# Patient Record
Sex: FEMALE | Race: WHITE | Employment: FULL TIME | URBAN - METROPOLITAN AREA
[De-identification: names, ages, dates, MRNs, and addresses within clinical notes are randomized per-mention and may not be internally consistent; named-entity substitution may affect disease eponyms.]

---

## 2017-03-19 ENCOUNTER — HOSPITAL ENCOUNTER (OUTPATIENT)
Dept: OTHER | Age: 62
Discharge: HOME | End: 2017-03-19
Attending: INTERNAL MEDICINE | Admitting: INTERNAL MEDICINE

## 2017-03-19 LAB
A/G RATIO: 1.1 (ref 1.1–2.2)
ALBUMIN SERPL-MCNC: 4.3 G/DL (ref 3.4–5)
ALP BLD-CCNC: 66 U/L (ref 40–129)
ALT SERPL-CCNC: 19 U/L (ref 10–40)
ANION GAP SERPL CALCULATED.3IONS-SCNC: 16 MMOL/L (ref 3–16)
AST SERPL-CCNC: 21 U/L (ref 15–37)
BASOPHILS ABSOLUTE: 0 K/UL (ref 0–0.2)
BASOPHILS RELATIVE PERCENT: 0.5 %
BILIRUB SERPL-MCNC: 0.3 MG/DL (ref 0–1)
BUN BLDV-MCNC: 17 MG/DL (ref 7–20)
CALCIUM SERPL-MCNC: 10 MG/DL (ref 8.3–10.6)
CHLORIDE BLD-SCNC: 96 MMOL/L (ref 99–110)
CHOLESTEROL, TOTAL: 166 MG/DL (ref 0–199)
CO2: 23 MMOL/L (ref 21–32)
CREAT SERPL-MCNC: 0.9 MG/DL (ref 0.6–1.2)
CREATININE URINE: 70.3 MG/DL (ref 28–259)
EOSINOPHILS ABSOLUTE: 0.1 K/UL (ref 0–0.6)
EOSINOPHILS RELATIVE PERCENT: 1.6 %
GFR AFRICAN AMERICAN: >60
GFR NON-AFRICAN AMERICAN: >60
GLOBULIN: 4 G/DL
GLUCOSE BLD-MCNC: 136 MG/DL (ref 70–99)
HCT VFR BLD CALC: 42 % (ref 36–48)
HDLC SERPL-MCNC: 39 MG/DL (ref 40–60)
HEMOGLOBIN: 13.7 G/DL (ref 12–16)
LDL CHOLESTEROL CALCULATED: 95 MG/DL
LYMPHOCYTES ABSOLUTE: 2.5 K/UL (ref 1–5.1)
LYMPHOCYTES RELATIVE PERCENT: 30.4 %
MCH RBC QN AUTO: 29.9 PG (ref 26–34)
MCHC RBC AUTO-ENTMCNC: 32.5 G/DL (ref 31–36)
MCV RBC AUTO: 91.7 FL (ref 80–100)
MICROALBUMIN UR-MCNC: <1.2 MG/DL
MICROALBUMIN/CREAT UR-RTO: NORMAL MG/G (ref 0–30)
MONOCYTES ABSOLUTE: 0.6 K/UL (ref 0–1.3)
MONOCYTES RELATIVE PERCENT: 6.8 %
NEUTROPHILS ABSOLUTE: 5 K/UL (ref 1.7–7.7)
NEUTROPHILS RELATIVE PERCENT: 60.7 %
PDW BLD-RTO: 14 % (ref 12.4–15.4)
PLATELET # BLD: 340 K/UL (ref 135–450)
PMV BLD AUTO: 8.6 FL (ref 5–10.5)
POTASSIUM SERPL-SCNC: 4.8 MMOL/L (ref 3.5–5.1)
RBC # BLD: 4.57 M/UL (ref 4–5.2)
SODIUM BLD-SCNC: 135 MMOL/L (ref 136–145)
TOTAL PROTEIN: 8.3 G/DL (ref 6.4–8.2)
TRIGL SERPL-MCNC: 159 MG/DL (ref 0–150)
TSH SERPL DL<=0.05 MIU/L-ACNC: 0.94 UIU/ML (ref 0.27–4.2)
VITAMIN B-12: 280 PG/ML (ref 211–911)
VITAMIN D 25-HYDROXY: 14.6 NG/ML
VLDLC SERPL CALC-MCNC: 32 MG/DL
WBC # BLD: 8.2 K/UL (ref 4–11)

## 2017-03-20 LAB
ESTIMATED AVERAGE GLUCOSE: 162.8 MG/DL
HBA1C MFR BLD: 7.3 %

## 2017-03-21 ENCOUNTER — TELEPHONE (OUTPATIENT)
Dept: PULMONOLOGY | Age: 62
End: 2017-03-21

## 2017-03-22 ENCOUNTER — OFFICE VISIT (OUTPATIENT)
Dept: PULMONOLOGY | Age: 62
End: 2017-03-22

## 2017-03-22 VITALS
HEIGHT: 65 IN | OXYGEN SATURATION: 98 % | SYSTOLIC BLOOD PRESSURE: 100 MMHG | BODY MASS INDEX: 42.15 KG/M2 | TEMPERATURE: 98.2 F | HEART RATE: 97 BPM | RESPIRATION RATE: 16 BRPM | DIASTOLIC BLOOD PRESSURE: 60 MMHG | WEIGHT: 253 LBS

## 2017-03-22 DIAGNOSIS — J44.9 CHRONIC OBSTRUCTIVE PULMONARY DISEASE, UNSPECIFIED COPD TYPE (HCC): Primary | ICD-10-CM

## 2017-03-22 DIAGNOSIS — G47.33 OSA (OBSTRUCTIVE SLEEP APNEA): ICD-10-CM

## 2017-03-22 DIAGNOSIS — E66.01 MORBID OBESITY WITH BMI OF 40.0-44.9, ADULT (HCC): ICD-10-CM

## 2017-03-22 PROCEDURE — 99214 OFFICE O/P EST MOD 30 MIN: CPT | Performed by: INTERNAL MEDICINE

## 2017-03-22 ASSESSMENT — SLEEP AND FATIGUE QUESTIONNAIRES
HOW LIKELY ARE YOU TO NOD OFF OR FALL ASLEEP WHILE SITTING AND TALKING TO SOMEONE: 0
HOW LIKELY ARE YOU TO NOD OFF OR FALL ASLEEP WHEN YOU ARE A PASSENGER IN A CAR FOR AN HOUR WITHOUT A BREAK: 1
HOW LIKELY ARE YOU TO NOD OFF OR FALL ASLEEP WHILE SITTING AND READING: 1
ESS TOTAL SCORE: 7
HOW LIKELY ARE YOU TO NOD OFF OR FALL ASLEEP WHILE SITTING INACTIVE IN A PUBLIC PLACE: 0
HOW LIKELY ARE YOU TO NOD OFF OR FALL ASLEEP WHILE SITTING QUIETLY AFTER LUNCH WITHOUT ALCOHOL: 1
NECK CIRCUMFERENCE (INCHES): 16
HOW LIKELY ARE YOU TO NOD OFF OR FALL ASLEEP WHILE WATCHING TV: 1
HOW LIKELY ARE YOU TO NOD OFF OR FALL ASLEEP WHILE LYING DOWN TO REST IN THE AFTERNOON WHEN CIRCUMSTANCES PERMIT: 3
HOW LIKELY ARE YOU TO NOD OFF OR FALL ASLEEP IN A CAR, WHILE STOPPED FOR A FEW MINUTES IN TRAFFIC: 0

## 2018-03-26 ENCOUNTER — OFFICE VISIT (OUTPATIENT)
Dept: PULMONOLOGY | Age: 63
End: 2018-03-26

## 2018-03-26 ENCOUNTER — TELEPHONE (OUTPATIENT)
Dept: PULMONOLOGY | Age: 63
End: 2018-03-26

## 2018-03-26 VITALS
TEMPERATURE: 97.3 F | BODY MASS INDEX: 43.15 KG/M2 | SYSTOLIC BLOOD PRESSURE: 132 MMHG | HEIGHT: 65 IN | HEART RATE: 98 BPM | RESPIRATION RATE: 16 BRPM | WEIGHT: 259 LBS | DIASTOLIC BLOOD PRESSURE: 83 MMHG | OXYGEN SATURATION: 96 %

## 2018-03-26 DIAGNOSIS — Z71.89 ENCOUNTER FOR BIPAP USE COUNSELING: ICD-10-CM

## 2018-03-26 DIAGNOSIS — E66.01 OBESITY, CLASS III, BMI 40-49.9 (MORBID OBESITY) (HCC): ICD-10-CM

## 2018-03-26 DIAGNOSIS — G47.33 OSA (OBSTRUCTIVE SLEEP APNEA): Primary | ICD-10-CM

## 2018-03-26 PROBLEM — E66.813 OBESITY, CLASS III, BMI 40-49.9 (MORBID OBESITY) (HCC): Status: ACTIVE | Noted: 2018-03-26

## 2018-03-26 PROCEDURE — 99213 OFFICE O/P EST LOW 20 MIN: CPT | Performed by: NURSE PRACTITIONER

## 2018-03-26 ASSESSMENT — SLEEP AND FATIGUE QUESTIONNAIRES
HOW LIKELY ARE YOU TO NOD OFF OR FALL ASLEEP WHEN YOU ARE A PASSENGER IN A CAR FOR AN HOUR WITHOUT A BREAK: 1
HOW LIKELY ARE YOU TO NOD OFF OR FALL ASLEEP WHILE SITTING QUIETLY AFTER LUNCH WITHOUT ALCOHOL: 0
ESS TOTAL SCORE: 8
HOW LIKELY ARE YOU TO NOD OFF OR FALL ASLEEP WHILE SITTING AND READING: 1
HOW LIKELY ARE YOU TO NOD OFF OR FALL ASLEEP WHILE SITTING INACTIVE IN A PUBLIC PLACE: 1
HOW LIKELY ARE YOU TO NOD OFF OR FALL ASLEEP WHILE LYING DOWN TO REST IN THE AFTERNOON WHEN CIRCUMSTANCES PERMIT: 3
NECK CIRCUMFERENCE (INCHES): 16.25
HOW LIKELY ARE YOU TO NOD OFF OR FALL ASLEEP WHILE SITTING AND TALKING TO SOMEONE: 0
HOW LIKELY ARE YOU TO NOD OFF OR FALL ASLEEP WHILE WATCHING TV: 2
HOW LIKELY ARE YOU TO NOD OFF OR FALL ASLEEP IN A CAR, WHILE STOPPED FOR A FEW MINUTES IN TRAFFIC: 0

## 2018-03-26 NOTE — PROGRESS NOTES
Patient ID: Ruy Cheatham is a 65 y.o. female who is being seen today for   Chief Complaint   Patient presents with    Sleep Apnea     1 year fu         HPI:     Ruy Cheatham is a 65 y.o. female in office for JAYY follow up. Patient is using BiPAP 7 hrs/night. Using humidifier. No snoring on BiPAP. The pressure is too light. The mask is comfortable-full face mask. No mask leak. No significant daytime sleepiness. No nodding off when driving. + dry mouth and nose at times- has not adjusted humidification- set on 2. +fatigue. Bedtime is 11 pm and rise time is 6 am. Sleep onset is few minutes. Wakes up 0-1 times at night total. 0-1 nocturia. It takes few minutes to fall back a sleep. 1-2 naps during the day for 20 minutes. No headache in am. No weight gain. 3 caffienated beverages during the day. No alcohol. ESS is 8      Sleep Medicine 3/26/2018 3/22/2017 3/8/2016 7/28/2015 5/19/2015 4/22/2015   Sitting and reading 1 1 0 1 3 0   Watching TV 2 1 1 2 3 2   Sitting, inactive in a public place (e.g. a theatre or a meeting) 1 0 1 1 2 1   As a passenger in a car for an hour without a break 1 1 1 2 2 2   Lying down to rest in the afternoon when circumstances permit 3 3 0 3 2 2   Sitting and talking to someone 0 0 0 1 0 0   Sitting quietly after a lunch without alcohol 0 1 0 1 2 2   In a car, while stopped for a few minutes in traffic 0 0 0 1 0 0   Total score 8 7 3 12 14 9   Neck circumference 16.25 16 16 16 16 15       Past Medical History:  Past Medical History:   Diagnosis Date    [facility] [address]     Diabetes mellitus [facility] [address]     Hypertension        Past Surgical History:        Procedure Laterality Date    CHOLECYSTECTOMY      COLPOSCOPY         Allergies:  is allergic to penicillins. Social History:    TOBACCO:   reports that she quit smoking about 3 years ago. Her smoking use included Cigarettes. She has a 30.00 pack-year smoking history.  She has never used smokeless compliance data:  Compliance download report from 2/24/18 to 3/25/18 reviewed today by me and showed patient is using machine 5:34 hrs/night with 80% compliance and AHI 1.0 within this time frame. 24/30days with greater than 4 hours of machine use. 90% pressure 18.7/14.7 cm H20    Assessment:      · Severe JAYY. Auto BiPAP EPAP minimum 13, IPAP maximum 19, pressure support 4 cm H2O. Optimal compliance and efficacy on review today. · Obesity  · COPDfollowed by Dr. Jennifer Hunter  · DM and HTN followed by PCP    Plan:     - Changed in office to BiPAP 19/15 cm H2O  -Follow AHI and adjust pressure if needed  -Mask fitting in office with RT  -Demonstrated in office how to adjust humidification level. Changed from 2 to 3 today-can adjust further if needed  - Advised to use BIPAP 6-8 hrs at night and during naps. - Replacement of mask, tubing, head straps every 3-6 months or sooner if damaged. - Patient instructed to contact [employer] for any mask, tubing or machine trouble shooting if problems arise.  - Sleep hygiene  - Avoid sedatives, alcohol and caffeinated drinks at bed time. - Patient counseled to never drive or operate heavy machinery while fatigue, drowsy or sleepy. - Weight loss is recommended as a long-term intervention.  - Complications of JAYY if not treated were discussed with patient patient, including: systemic hypertension, pulmonary hypertension, cardiovascular morbidities, car accidents and all cause mortality.  -Patient education handout provided regarding sleep tips and PAP cleaning recommendations     Follow up:  One year, sooner if needed

## 2018-03-26 NOTE — PATIENT INSTRUCTIONS
Please keep all of your future appointments scheduled by [address] office. Sleep Hygiene. .. Tips for better sleep. .. Avoid naps. This will ensure you are sleepy at bedtime. If you have to take a nap, sleep less than 1 hour, before 3 pm.  Sleep only when sleepy; this reduces the time you are awake in bed. Regular exercise is recommended to help you deepen your sleep, but not within 4-6 hours of your bedtime. Timing of exercise is important, aim to exercise early in the morning or early afternoon. A light snack may help you fall asleep. Warm milk and foods high in the amino acid tryptophan, such as bananas, may help you to sleep  Be sure to avoid heavy, spicy or sugary foods 4-6 hours before bedtime and avoid at snack time. Stay away from stimulants such as caffeine and nicotine for at least 4-6 hours before bed. Stimulants can interfere with your ability to fall asleep. Caffeine is found in tea, cola, coffee, cocoa and chocolate and is best avoided at bedtime. Nicotine is found in tobacco products. Avoid alcohol 4-6 hours before bedtime. Alcohol has an immediate sleep-inducing effect, after a few hours when alcohol levels fall there is a stimulant or wake-up effect and will cause fragmented sleep. Sleep rituals are important. Give your body clues it is time to slow down and sleep. Examples include; yoga, deep breathing, listen to relaxing music, a hot bath or a few minutes of reading. Have a fixed bedtime and awakening time, Even on weekends! You will feel better keeping a regular sleep cycle, even if you are retired or not working. Get into your favorite sleep position. If not asleep in 30 minutes, get up and do something boring until you feel sleepy. Remember not to expose yourself to bright lights such as TV, phone or tablet screens. Only use your bed for sleeping. Do not use your bed as an office, workroom or recreation room. Use comfortable bedding. Uncomfortable bedding can prevent good sleep. Ensure your bedroom is quiet and comfortable. A cooler room along with enough blankets to stay warm is recommended. If your room is too noisy, try a white noise machine. If too bright, try black out shades or an eye mask. Dont take worries to bed. Leave worries about work, school etc. behind you when you go to bed. Some people find it helpful to assign a worry period in the evening or late afternoon to write down your worries and get them out of your system. CPAP Equipment Cleaning and Disinfecting Schedule  Equipment Cleaning Frequency Instructions  Disinfecting Frequency   Non-Disposable Filters  Weekly Mild soapy water, Rinse, Air Dry Not Required   Disposable Filters Change as needed  2-4 weeks Do Not Wash Not Required   Hose/tubing Daily Mild soapy water, Rinse, Air Dry Once a week   Mask / Nasal Pillows Daily Mild soapy water, Rinse, Air Dry Once a week   Headgear Weekly Hand wash, Mild soapy water, Rinse, Dry  Not Required   Humidifier Daily Empty water daily  Mild soapy water, Rinse well, Air Dry  Once a week   CPAP Unit As Needed Dust with damp cloth,  No detergents or sprays Not Required         Disinfect (per schedule) with 1 part white vinegar and 3 parts water- soak mask and water chamber for 30 minutes every 1-2 weeks, more often if sick. Allow water/vinegar mixture to run through tubing. Allow all equipment to air dry. Drying Hints:   Always hang tubing away from direct sunlight, as this will cause the tubing to become yellow, brittle and crack over a period of time. DO NOT attach the wet tubing to your CPAP unit to blow-dry it. The moisture from the tubing can drain back into your machine. Moisture in your unit can cause sudden pressure increases or short circuits  DO's and DON'Ts:  - Don't use alcohol-based products to clean your mask, because it can cause the materials to become hard and brittle.    - Don't put headgear in the washer or dryer  - Don't use any caustic or household cleaning solutions such as bleach on your CPAP   equipment.  - Do follow the recommended cleaning schedule. - Do change your disposable filter frequently. Adapted From: [employer]/cleaning. shtm.   These are general suggestions for all models please follow specific s recommendations and specific instructions

## 2018-04-05 ENCOUNTER — OFFICE VISIT (OUTPATIENT)
Dept: ORTHOPEDIC SURGERY | Age: 63
End: 2018-04-05

## 2018-04-05 VITALS
HEIGHT: 66 IN | SYSTOLIC BLOOD PRESSURE: 119 MMHG | WEIGHT: 250 LBS | DIASTOLIC BLOOD PRESSURE: 72 MMHG | BODY MASS INDEX: 40.18 KG/M2 | HEART RATE: 80 BPM

## 2018-04-05 DIAGNOSIS — M23.203 DEGENERATIVE TEAR OF MEDIAL MENISCUS OF RIGHT KNEE: ICD-10-CM

## 2018-04-05 DIAGNOSIS — M25.561 ACUTE PAIN OF RIGHT KNEE: Primary | ICD-10-CM

## 2018-04-05 PROCEDURE — 20610 DRAIN/INJ JOINT/BURSA W/O US: CPT | Performed by: ORTHOPAEDIC SURGERY

## 2018-04-05 PROCEDURE — 99203 OFFICE O/P NEW LOW 30 MIN: CPT | Performed by: ORTHOPAEDIC SURGERY

## 2018-04-05 RX ORDER — MELOXICAM 15 MG/1
15 TABLET ORAL DAILY
Qty: 30 TABLET | Refills: 3 | Status: SHIPPED | OUTPATIENT
Start: 2018-04-05 | End: 2018-08-03 | Stop reason: SDUPTHER

## 2018-04-19 ENCOUNTER — OFFICE VISIT (OUTPATIENT)
Dept: ORTHOPEDIC SURGERY | Age: 63
End: 2018-04-19

## 2018-04-19 VITALS — WEIGHT: 250 LBS | BODY MASS INDEX: 44.3 KG/M2 | HEIGHT: 63 IN

## 2018-04-19 DIAGNOSIS — M23.203 DEGENERATIVE TEAR OF MEDIAL MENISCUS OF RIGHT KNEE: Primary | ICD-10-CM

## 2018-04-19 PROCEDURE — 99213 OFFICE O/P EST LOW 20 MIN: CPT | Performed by: ORTHOPAEDIC SURGERY

## 2018-04-20 ENCOUNTER — TELEPHONE (OUTPATIENT)
Dept: ORTHOPEDIC SURGERY | Age: 63
End: 2018-04-20

## 2018-05-01 ENCOUNTER — OFFICE VISIT (OUTPATIENT)
Dept: ORTHOPEDIC SURGERY | Age: 63
End: 2018-05-01

## 2018-05-01 VITALS
HEIGHT: 66 IN | BODY MASS INDEX: 41.78 KG/M2 | HEART RATE: 82 BPM | DIASTOLIC BLOOD PRESSURE: 60 MMHG | SYSTOLIC BLOOD PRESSURE: 114 MMHG | WEIGHT: 260 LBS

## 2018-05-01 DIAGNOSIS — M84.453A INSUFFICIENCY FRACTURE OF MEDIAL FEMORAL CONDYLE (HCC): ICD-10-CM

## 2018-05-01 DIAGNOSIS — M84.469A INSUFFICIENCY FRACTURE OF TIBIA, INITIAL ENCOUNTER: Primary | ICD-10-CM

## 2018-05-01 DIAGNOSIS — M23.203 DEGENERATIVE TEAR OF MEDIAL MENISCUS OF RIGHT KNEE: ICD-10-CM

## 2018-05-01 DIAGNOSIS — M94.261 CHONDROMALACIA OF KNEE, RIGHT: ICD-10-CM

## 2018-05-01 PROCEDURE — 99214 OFFICE O/P EST MOD 30 MIN: CPT | Performed by: ORTHOPAEDIC SURGERY

## 2018-05-08 ENCOUNTER — PAT TELEPHONE (OUTPATIENT)
Dept: PREADMISSION TESTING | Age: 63
End: 2018-05-08

## 2018-05-08 ENCOUNTER — TELEPHONE (OUTPATIENT)
Dept: ORTHOPEDIC SURGERY | Age: 63
End: 2018-05-08

## 2018-05-08 VITALS — BODY MASS INDEX: 41.78 KG/M2 | HEIGHT: 66 IN | WEIGHT: 260 LBS

## 2018-05-18 ENCOUNTER — HOSPITAL ENCOUNTER (OUTPATIENT)
Dept: SURGERY | Age: 63
Discharge: HOME | End: 2018-05-18
Attending: ORTHOPAEDIC SURGERY | Admitting: ORTHOPAEDIC SURGERY

## 2018-05-18 VITALS
SYSTOLIC BLOOD PRESSURE: 143 MMHG | OXYGEN SATURATION: 97 % | DIASTOLIC BLOOD PRESSURE: 62 MMHG | BODY MASS INDEX: 41.78 KG/M2 | RESPIRATION RATE: 16 BRPM | HEIGHT: 66 IN | WEIGHT: 260 LBS | TEMPERATURE: 98.4 F | HEART RATE: 87 BPM

## 2018-05-18 DIAGNOSIS — M84.453A INSUFFICIENCY FRACTURE OF MEDIAL FEMORAL CONDYLE (HCC): Primary | ICD-10-CM

## 2018-05-18 LAB
GLUCOSE BLD-MCNC: 110 MG/DL (ref 70–99)
GLUCOSE BLD-MCNC: 135 MG/DL (ref 70–99)
PERFORMED ON: ABNORMAL
PERFORMED ON: ABNORMAL

## 2018-05-18 RX ORDER — LABETALOL HYDROCHLORIDE 5 MG/ML
5 INJECTION, SOLUTION INTRAVENOUS EVERY 10 MIN PRN
Status: DISCONTINUED | OUTPATIENT
Start: 2018-05-18 | End: 2018-05-19 | Stop reason: HOSPADM

## 2018-05-18 RX ORDER — LIDOCAINE HYDROCHLORIDE 10 MG/ML
1 INJECTION, SOLUTION EPIDURAL; INFILTRATION; INTRACAUDAL; PERINEURAL
Status: COMPLETED | OUTPATIENT
Start: 2018-05-18 | End: 2018-05-18

## 2018-05-18 RX ORDER — OXYCODONE HYDROCHLORIDE AND ACETAMINOPHEN 5; 325 MG/1; MG/1
2 TABLET ORAL PRN
Status: COMPLETED | OUTPATIENT
Start: 2018-05-18 | End: 2018-05-18

## 2018-05-18 RX ORDER — HYDROMORPHONE HCL 110MG/55ML
0.5 PATIENT CONTROLLED ANALGESIA SYRINGE INTRAVENOUS EVERY 5 MIN PRN
Status: DISCONTINUED | OUTPATIENT
Start: 2018-05-18 | End: 2018-05-19 | Stop reason: HOSPADM

## 2018-05-18 RX ORDER — BUPIVACAINE HYDROCHLORIDE 5 MG/ML
INJECTION, SOLUTION EPIDURAL; INTRACAUDAL
Status: DISPENSED
Start: 2018-05-18 | End: 2018-05-18

## 2018-05-18 RX ORDER — SODIUM CHLORIDE 0.9 % (FLUSH) 0.9 %
10 SYRINGE (ML) INJECTION PRN
Status: DISCONTINUED | OUTPATIENT
Start: 2018-05-18 | End: 2018-05-19 | Stop reason: HOSPADM

## 2018-05-18 RX ORDER — SODIUM CHLORIDE 0.9 % (FLUSH) 0.9 %
10 SYRINGE (ML) INJECTION EVERY 12 HOURS SCHEDULED
Status: DISCONTINUED | OUTPATIENT
Start: 2018-05-18 | End: 2018-05-19 | Stop reason: HOSPADM

## 2018-05-18 RX ORDER — HYDROMORPHONE HCL 110MG/55ML
0.25 PATIENT CONTROLLED ANALGESIA SYRINGE INTRAVENOUS EVERY 5 MIN PRN
Status: DISCONTINUED | OUTPATIENT
Start: 2018-05-18 | End: 2018-05-19 | Stop reason: HOSPADM

## 2018-05-18 RX ORDER — MEPERIDINE HYDROCHLORIDE 25 MG/ML
12.5 INJECTION INTRAMUSCULAR; INTRAVENOUS; SUBCUTANEOUS EVERY 5 MIN PRN
Status: DISCONTINUED | OUTPATIENT
Start: 2018-05-18 | End: 2018-05-19 | Stop reason: HOSPADM

## 2018-05-18 RX ORDER — HYDRALAZINE HYDROCHLORIDE 20 MG/ML
5 INJECTION INTRAMUSCULAR; INTRAVENOUS EVERY 10 MIN PRN
Status: DISCONTINUED | OUTPATIENT
Start: 2018-05-18 | End: 2018-05-19 | Stop reason: HOSPADM

## 2018-05-18 RX ORDER — DEXAMETHASONE SODIUM PHOSPHATE 10 MG/ML
INJECTION, SOLUTION INTRAMUSCULAR; INTRAVENOUS
Status: DISPENSED
Start: 2018-05-18 | End: 2018-05-18

## 2018-05-18 RX ORDER — ONDANSETRON 2 MG/ML
4 INJECTION INTRAMUSCULAR; INTRAVENOUS EVERY 10 MIN PRN
Status: DISCONTINUED | OUTPATIENT
Start: 2018-05-18 | End: 2018-05-19 | Stop reason: HOSPADM

## 2018-05-18 RX ORDER — OXYCODONE HYDROCHLORIDE AND ACETAMINOPHEN 5; 325 MG/1; MG/1
1 TABLET ORAL EVERY 6 HOURS PRN
Qty: 28 TABLET | Refills: 0 | Status: SHIPPED | OUTPATIENT
Start: 2018-05-18 | End: 2018-05-25

## 2018-05-18 RX ORDER — OXYCODONE HYDROCHLORIDE AND ACETAMINOPHEN 5; 325 MG/1; MG/1
TABLET ORAL
Status: DISPENSED
Start: 2018-05-18 | End: 2018-05-18

## 2018-05-18 RX ORDER — ACETAMINOPHEN 10 MG/ML
1000 INJECTION, SOLUTION INTRAVENOUS ONCE
Status: COMPLETED | OUTPATIENT
Start: 2018-05-18 | End: 2018-05-18

## 2018-05-18 RX ORDER — OXYCODONE HYDROCHLORIDE AND ACETAMINOPHEN 5; 325 MG/1; MG/1
1 TABLET ORAL PRN
Status: COMPLETED | OUTPATIENT
Start: 2018-05-18 | End: 2018-05-18

## 2018-05-18 RX ORDER — SODIUM CHLORIDE, SODIUM LACTATE, POTASSIUM CHLORIDE, CALCIUM CHLORIDE 600; 310; 30; 20 MG/100ML; MG/100ML; MG/100ML; MG/100ML
INJECTION, SOLUTION INTRAVENOUS CONTINUOUS
Status: DISCONTINUED | OUTPATIENT
Start: 2018-05-18 | End: 2018-05-19 | Stop reason: HOSPADM

## 2018-05-18 RX ORDER — MIDAZOLAM HYDROCHLORIDE 1 MG/ML
INJECTION INTRAMUSCULAR; INTRAVENOUS
Status: DISPENSED
Start: 2018-05-18 | End: 2018-05-18

## 2018-05-18 RX ADMIN — LIDOCAINE HYDROCHLORIDE 0.1 ML: 10 INJECTION, SOLUTION EPIDURAL; INFILTRATION; INTRACAUDAL; PERINEURAL at 07:05

## 2018-05-18 RX ADMIN — SODIUM CHLORIDE, SODIUM LACTATE, POTASSIUM CHLORIDE, CALCIUM CHLORIDE: 600; 310; 30; 20 INJECTION, SOLUTION INTRAVENOUS at 07:06

## 2018-05-18 RX ADMIN — OXYCODONE HYDROCHLORIDE AND ACETAMINOPHEN 2 TABLET: 5; 325 TABLET ORAL at 10:25

## 2018-05-18 RX ADMIN — ACETAMINOPHEN 1000 MG: 10 INJECTION, SOLUTION INTRAVENOUS at 08:03

## 2018-05-18 ASSESSMENT — PAIN SCALES - GENERAL
PAINLEVEL_OUTOF10: 7
PAINLEVEL_OUTOF10: 0
PAINLEVEL_OUTOF10: 0

## 2018-05-18 ASSESSMENT — PAIN DESCRIPTION - PAIN TYPE: TYPE: SURGICAL PAIN

## 2018-05-18 ASSESSMENT — PAIN DESCRIPTION - DESCRIPTORS
DESCRIPTORS: ACHING
DESCRIPTORS: BURNING;ACHING

## 2018-05-18 ASSESSMENT — COPD QUESTIONNAIRES: CAT_SEVERITY: MILD

## 2018-05-18 ASSESSMENT — PAIN DESCRIPTION - ORIENTATION: ORIENTATION: RIGHT

## 2018-05-18 ASSESSMENT — PAIN DESCRIPTION - LOCATION: LOCATION: KNEE

## 2018-05-18 ASSESSMENT — ACTIVITIES OF DAILY LIVING (ADL): EFFECT OF PAIN ON DAILY ACTIVITIES: WALKING INCREASES PAIN

## 2018-05-18 ASSESSMENT — PAIN - FUNCTIONAL ASSESSMENT: PAIN_FUNCTIONAL_ASSESSMENT: 0-10

## 2018-05-21 DIAGNOSIS — M84.453A INSUFFICIENCY FRACTURE OF MEDIAL FEMORAL CONDYLE (HCC): Primary | ICD-10-CM

## 2018-05-24 ENCOUNTER — OFFICE VISIT (OUTPATIENT)
Dept: ORTHOPEDIC SURGERY | Age: 63
End: 2018-05-24

## 2018-05-24 ENCOUNTER — HOSPITAL ENCOUNTER (OUTPATIENT)
Dept: PHYSICAL THERAPY | Age: 63
Discharge: HOME | End: 2018-05-31
Admitting: ORTHOPAEDIC SURGERY

## 2018-05-24 DIAGNOSIS — Z98.890 S/P ARTHROSCOPIC PARTIAL MEDIAL MENISCECTOMY: Primary | ICD-10-CM

## 2018-05-24 PROCEDURE — 99024 POSTOP FOLLOW-UP VISIT: CPT | Performed by: ORTHOPAEDIC SURGERY

## 2018-05-30 ENCOUNTER — HOSPITAL ENCOUNTER (OUTPATIENT)
Dept: PHYSICAL THERAPY | Age: 63
Discharge: HOME | End: 2018-06-30
Admitting: ORTHOPAEDIC SURGERY

## 2018-06-01 ENCOUNTER — HOSPITAL ENCOUNTER (OUTPATIENT)
Dept: PHYSICAL THERAPY | Age: 63
Discharge: HOME | End: 2018-06-01
Attending: ORTHOPAEDIC SURGERY | Admitting: ORTHOPAEDIC SURGERY

## 2018-06-07 ENCOUNTER — HOSPITAL ENCOUNTER (OUTPATIENT)
Dept: PHYSICAL THERAPY | Age: 63
Discharge: HOME | End: 2018-06-08
Admitting: ORTHOPAEDIC SURGERY

## 2018-06-11 ENCOUNTER — HOSPITAL ENCOUNTER (OUTPATIENT)
Dept: PHYSICAL THERAPY | Age: 63
Discharge: HOME | End: 2018-06-12
Admitting: ORTHOPAEDIC SURGERY

## 2018-06-11 DIAGNOSIS — M23.203 DEGENERATIVE TEAR OF MEDIAL MENISCUS OF RIGHT KNEE: Primary | ICD-10-CM

## 2018-06-11 RX ORDER — HYDROCODONE BITARTRATE AND ACETAMINOPHEN 5; 325 MG/1; MG/1
1 TABLET ORAL EVERY 6 HOURS PRN
Qty: 28 TABLET | Refills: 0 | Status: SHIPPED | OUTPATIENT
Start: 2018-06-11 | End: 2018-06-18

## 2018-06-21 ENCOUNTER — OFFICE VISIT (OUTPATIENT)
Dept: ORTHOPEDIC SURGERY | Age: 63
End: 2018-06-21

## 2018-06-21 DIAGNOSIS — M17.11 PRIMARY OSTEOARTHRITIS OF RIGHT KNEE: ICD-10-CM

## 2018-06-21 DIAGNOSIS — Z98.890 S/P ARTHROSCOPIC PARTIAL MEDIAL MENISCECTOMY: Primary | ICD-10-CM

## 2018-06-21 PROCEDURE — 99024 POSTOP FOLLOW-UP VISIT: CPT | Performed by: ORTHOPAEDIC SURGERY

## 2018-06-28 ENCOUNTER — TELEPHONE (OUTPATIENT)
Dept: ORTHOPEDIC SURGERY | Age: 63
End: 2018-06-28

## 2018-07-01 ENCOUNTER — HOSPITAL ENCOUNTER (OUTPATIENT)
Dept: PHYSICAL THERAPY | Age: 63
Discharge: HOME | End: 2018-07-01
Attending: ORTHOPAEDIC SURGERY | Admitting: ORTHOPAEDIC SURGERY

## 2018-07-16 ENCOUNTER — OFFICE VISIT (OUTPATIENT)
Dept: ORTHOPEDIC SURGERY | Age: 63
End: 2018-07-16

## 2018-07-16 VITALS — WEIGHT: 248 LBS | BODY MASS INDEX: 39.86 KG/M2 | HEIGHT: 66 IN

## 2018-07-16 DIAGNOSIS — M17.11 PRIMARY OSTEOARTHRITIS OF RIGHT KNEE: Primary | ICD-10-CM

## 2018-07-16 PROCEDURE — 20610 DRAIN/INJ JOINT/BURSA W/O US: CPT | Performed by: ORTHOPAEDIC SURGERY

## 2018-07-16 PROCEDURE — 99213 OFFICE O/P EST LOW 20 MIN: CPT | Performed by: ORTHOPAEDIC SURGERY

## 2018-07-16 NOTE — PROGRESS NOTES
Orthovisc  injection of the Right knee  2) Osteoarthritis    Plan:  1) ice, elevation, gentle range of motion as needed for swelling or stiffness of the knee  2) NSAIDs for any pain after injection   3) F/U NEXT WEEK FOR 2ND INJECTION

## 2018-07-23 ENCOUNTER — OFFICE VISIT (OUTPATIENT)
Dept: ORTHOPEDIC SURGERY | Age: 63
End: 2018-07-23

## 2018-07-23 VITALS
WEIGHT: 248.02 LBS | HEART RATE: 64 BPM | BODY MASS INDEX: 39.86 KG/M2 | SYSTOLIC BLOOD PRESSURE: 110 MMHG | DIASTOLIC BLOOD PRESSURE: 74 MMHG | HEIGHT: 66 IN

## 2018-07-23 DIAGNOSIS — M17.11 PRIMARY OSTEOARTHRITIS OF RIGHT KNEE: Primary | ICD-10-CM

## 2018-07-23 PROCEDURE — 99999 PR OFFICE/OUTPT VISIT,PROCEDURE ONLY: CPT | Performed by: ORTHOPAEDIC SURGERY

## 2018-07-23 PROCEDURE — 20610 DRAIN/INJ JOINT/BURSA W/O US: CPT | Performed by: ORTHOPAEDIC SURGERY

## 2018-07-23 RX ORDER — LISINOPRIL 20 MG/1
TABLET ORAL
Refills: 0 | Status: ON HOLD | COMMUNITY
Start: 2018-07-10 | End: 2020-09-22 | Stop reason: HOSPADM

## 2018-07-23 RX ORDER — SITAGLIPTIN 100 MG/1
TABLET, FILM COATED ORAL
COMMUNITY
Start: 2018-06-14 | End: 2019-04-08 | Stop reason: CLARIF

## 2018-07-23 RX ORDER — METFORMIN HYDROCHLORIDE 500 MG/1
TABLET, EXTENDED RELEASE ORAL
Refills: 0 | COMMUNITY
Start: 2018-07-12

## 2018-07-23 RX ORDER — ESCITALOPRAM OXALATE 20 MG/1
TABLET ORAL
Refills: 3 | COMMUNITY
Start: 2018-07-06 | End: 2021-08-04

## 2018-07-23 NOTE — PROGRESS NOTES
ORTHOVISC    Dose: 2ml    Site: Right  knee    [id number]    Lot #  [id number]
US  Assessment:  1)  Orthovisc  injection of the Right knee  2) Osteoarthritis    Plan:  1) ice, elevation, gentle range of motion as needed for swelling or stiffness of the knee  2) NSAIDs for any pain after injection   3) F/U 1 week      Maddie Coyle

## 2018-07-30 ENCOUNTER — OFFICE VISIT (OUTPATIENT)
Dept: ORTHOPEDIC SURGERY | Age: 63
End: 2018-07-30

## 2018-07-30 VITALS — HEIGHT: 66 IN | BODY MASS INDEX: 40.18 KG/M2 | WEIGHT: 250 LBS

## 2018-07-30 DIAGNOSIS — M17.11 PRIMARY OSTEOARTHRITIS OF RIGHT KNEE: Primary | ICD-10-CM

## 2018-07-30 PROCEDURE — 99999 PR OFFICE/OUTPT VISIT,PROCEDURE ONLY: CPT | Performed by: ORTHOPAEDIC SURGERY

## 2018-07-30 PROCEDURE — 20610 DRAIN/INJ JOINT/BURSA W/O US: CPT | Performed by: ORTHOPAEDIC SURGERY

## 2018-08-03 DIAGNOSIS — M23.203 DEGENERATIVE TEAR OF MEDIAL MENISCUS OF RIGHT KNEE: ICD-10-CM

## 2018-08-03 DIAGNOSIS — M25.561 ACUTE PAIN OF RIGHT KNEE: ICD-10-CM

## 2018-08-08 RX ORDER — MELOXICAM 15 MG/1
15 TABLET ORAL DAILY
Qty: 30 TABLET | Refills: 0 | Status: SHIPPED | OUTPATIENT
Start: 2018-08-08 | End: 2018-09-09 | Stop reason: SDUPTHER

## 2018-09-09 DIAGNOSIS — M23.203 DEGENERATIVE TEAR OF MEDIAL MENISCUS OF RIGHT KNEE: ICD-10-CM

## 2018-09-09 DIAGNOSIS — M25.561 ACUTE PAIN OF RIGHT KNEE: ICD-10-CM

## 2018-09-10 RX ORDER — MELOXICAM 15 MG/1
15 TABLET ORAL DAILY
Qty: 30 TABLET | Refills: 0 | Status: SHIPPED | OUTPATIENT
Start: 2018-09-10 | End: 2018-10-11 | Stop reason: SDUPTHER

## 2018-10-11 DIAGNOSIS — M23.203 DEGENERATIVE TEAR OF MEDIAL MENISCUS OF RIGHT KNEE: ICD-10-CM

## 2018-10-11 DIAGNOSIS — M25.561 ACUTE PAIN OF RIGHT KNEE: ICD-10-CM

## 2018-10-11 RX ORDER — MELOXICAM 15 MG/1
15 TABLET ORAL DAILY
Qty: 30 TABLET | Refills: 0 | Status: SHIPPED | OUTPATIENT
Start: 2018-10-11 | End: 2018-11-13 | Stop reason: SDUPTHER

## 2018-11-13 DIAGNOSIS — M23.203 DEGENERATIVE TEAR OF MEDIAL MENISCUS OF RIGHT KNEE: ICD-10-CM

## 2018-11-13 DIAGNOSIS — M25.561 ACUTE PAIN OF RIGHT KNEE: ICD-10-CM

## 2018-11-13 RX ORDER — MELOXICAM 15 MG/1
15 TABLET ORAL DAILY
Qty: 30 TABLET | Refills: 0 | Status: SHIPPED | OUTPATIENT
Start: 2018-11-13 | End: 2018-12-13 | Stop reason: SDUPTHER

## 2018-12-13 DIAGNOSIS — M23.203 DEGENERATIVE TEAR OF MEDIAL MENISCUS OF RIGHT KNEE: ICD-10-CM

## 2018-12-13 DIAGNOSIS — M25.561 ACUTE PAIN OF RIGHT KNEE: ICD-10-CM

## 2018-12-20 RX ORDER — MELOXICAM 15 MG/1
15 TABLET ORAL DAILY
Qty: 30 TABLET | Refills: 0 | Status: SHIPPED | OUTPATIENT
Start: 2018-12-20 | End: 2020-04-13 | Stop reason: ALTCHOICE

## 2019-01-22 DIAGNOSIS — M25.561 ACUTE PAIN OF RIGHT KNEE: ICD-10-CM

## 2019-01-22 DIAGNOSIS — M23.203 DEGENERATIVE TEAR OF MEDIAL MENISCUS OF RIGHT KNEE: ICD-10-CM

## 2019-01-23 RX ORDER — MELOXICAM 15 MG/1
15 TABLET ORAL DAILY
Qty: 30 TABLET | Refills: 0 | Status: SHIPPED | OUTPATIENT
Start: 2019-01-23 | End: 2019-02-19 | Stop reason: SDUPTHER

## 2019-02-19 DIAGNOSIS — M23.203 DEGENERATIVE TEAR OF MEDIAL MENISCUS OF RIGHT KNEE: ICD-10-CM

## 2019-02-19 DIAGNOSIS — M25.561 ACUTE PAIN OF RIGHT KNEE: ICD-10-CM

## 2019-02-19 RX ORDER — MELOXICAM 15 MG/1
15 TABLET ORAL DAILY
Qty: 30 TABLET | Refills: 0 | Status: SHIPPED | OUTPATIENT
Start: 2019-02-19 | End: 2019-03-22 | Stop reason: SDUPTHER

## 2019-03-22 DIAGNOSIS — M23.203 DEGENERATIVE TEAR OF MEDIAL MENISCUS OF RIGHT KNEE: ICD-10-CM

## 2019-03-22 DIAGNOSIS — M25.561 ACUTE PAIN OF RIGHT KNEE: ICD-10-CM

## 2019-03-25 RX ORDER — MELOXICAM 15 MG/1
15 TABLET ORAL DAILY
Qty: 30 TABLET | Refills: 0 | Status: SHIPPED | OUTPATIENT
Start: 2019-03-25 | End: 2019-04-08 | Stop reason: CLARIF

## 2019-04-08 ENCOUNTER — OFFICE VISIT (OUTPATIENT)
Dept: PULMONOLOGY | Age: 64
End: 2019-04-08
Payer: COMMERCIAL

## 2019-04-08 VITALS
TEMPERATURE: 98.2 F | SYSTOLIC BLOOD PRESSURE: 126 MMHG | OXYGEN SATURATION: 93 % | BODY MASS INDEX: 43.07 KG/M2 | WEIGHT: 268 LBS | RESPIRATION RATE: 16 BRPM | HEIGHT: 66 IN | DIASTOLIC BLOOD PRESSURE: 74 MMHG | HEART RATE: 102 BPM

## 2019-04-08 DIAGNOSIS — Z71.89 CPAP USE COUNSELING: ICD-10-CM

## 2019-04-08 DIAGNOSIS — G47.33 OSA (OBSTRUCTIVE SLEEP APNEA): Primary | ICD-10-CM

## 2019-04-08 DIAGNOSIS — G47.10 HYPERSOMNIA: ICD-10-CM

## 2019-04-08 DIAGNOSIS — E66.01 OBESITY, CLASS III, BMI 40-49.9 (MORBID OBESITY) (HCC): ICD-10-CM

## 2019-04-08 DIAGNOSIS — Z72.820 SLEEP DEPRIVATION: ICD-10-CM

## 2019-04-08 PROCEDURE — 99214 OFFICE O/P EST MOD 30 MIN: CPT | Performed by: NURSE PRACTITIONER

## 2019-04-08 ASSESSMENT — SLEEP AND FATIGUE QUESTIONNAIRES
HOW LIKELY ARE YOU TO NOD OFF OR FALL ASLEEP WHILE SITTING QUIETLY AFTER LUNCH WITHOUT ALCOHOL: 1
HOW LIKELY ARE YOU TO NOD OFF OR FALL ASLEEP WHILE WATCHING TV: 2
HOW LIKELY ARE YOU TO NOD OFF OR FALL ASLEEP WHILE SITTING AND TALKING TO SOMEONE: 0
HOW LIKELY ARE YOU TO NOD OFF OR FALL ASLEEP WHILE SITTING AND READING: 2
HOW LIKELY ARE YOU TO NOD OFF OR FALL ASLEEP WHILE SITTING INACTIVE IN A PUBLIC PLACE: 2
HOW LIKELY ARE YOU TO NOD OFF OR FALL ASLEEP IN A CAR, WHILE STOPPED FOR A FEW MINUTES IN TRAFFIC: 1
HOW LIKELY ARE YOU TO NOD OFF OR FALL ASLEEP WHILE LYING DOWN TO REST IN THE AFTERNOON WHEN CIRCUMSTANCES PERMIT: 2
HOW LIKELY ARE YOU TO NOD OFF OR FALL ASLEEP WHEN YOU ARE A PASSENGER IN A CAR FOR AN HOUR WITHOUT A BREAK: 2
ESS TOTAL SCORE: 12
NECK CIRCUMFERENCE (INCHES): 16

## 2019-04-08 NOTE — PROGRESS NOTES
Patient ID: Talisha Mcgregor is a 59 y.o. female who is being seen today for   Chief Complaint   Patient presents with    Sleep Apnea     1 year fu         HPI:     Talisha Mcgregor is a 59 y.o. female in office for JAYY follow up. Patient is using BiPAP  4 hrs/night. States she is getting less sleep due to taking care of her  who has chronic health issues. Using humidifier. No snoring on BiPAP. The pressure feels a little light but not uncomfortable. The mask is comfortable- full face mask. No mask leak. +daytime sleepiness. Denies nodding off when driving. No dry nose or throat.  +fatigue. Bedtime is MN and rise time is 430 am. Sleep onset is few minutes. Wakes up no times at night total. No nocturia. 1 naps during the day 60-90 min. No headache in am. No weight gain. 2 caffienated beverages during the day. No alcohol. ESS is 12. Sleep Medicine 4/8/2019 3/26/2018 3/22/2017 3/8/2016 7/28/2015 5/19/2015 4/22/2015   Sitting and reading 2 1 1 0 1 3 0   Watching TV 2 2 1 1 2 3 2   Sitting, inactive in a public place (e.g. a theatre or a meeting) 2 1 0 1 1 2 1   As a passenger in a car for an hour without a break 2 1 1 1 2 2 2   Lying down to rest in the afternoon when circumstances permit 2 3 3 0 3 2 2   Sitting and talking to someone 0 0 0 0 1 0 0   Sitting quietly after a lunch without alcohol 1 0 1 0 1 2 2   In a car, while stopped for a few minutes in traffic 1 0 0 0 1 0 0   Total score 12 8 7 3 12 14 9   Neck circumference 16 16.25 16 16 16 16 15       Past Medical History:  Past Medical History:   Diagnosis Date    COPD [facility]chronic obstructive [facility] disease) [facility] [address]     Diabetes mellitus ([facility] [address]     Hypertension     Primary osteoarthritis of right knee 6/21/2018       Past Surgical History:        Procedure Laterality Date    CHOLECYSTECTOMY      COLPOSCOPY      KNEE SURGERY         Allergies:  is allergic to penicillins.   Social History:    TOBACCO:   reports that she quit smoking about 4 years

## 2019-04-08 NOTE — PATIENT INSTRUCTIONS
Absolutely no driving if sleepy. It is your responsibility not to drive if fatigued, tired, or sleepy     Please keep all of your future appointments scheduled by [facility] office. Out of respect for other patients and providers, you may be asked to reschedule your appointment if you arrive later than your scheduled appointment time. Appointments cancelled less than 24hrs in advance will be considered a no show. Patients with three missed appointments within 1 year or four missed appointments within 2 years can be dismissed from the practice. Sleep Hygiene. .. Tips for better sleep. .. Avoid naps. This will ensure you are sleepy at bedtime. If you have to take a nap, sleep less than 1 hour, before 3 pm.  Sleep only when sleepy; this reduces the time you are awake in bed. Regular exercise is recommended to help you deepen your sleep, but not within 4-6 hours of your bedtime. Timing of exercise is important, aim to exercise early in the morning or early afternoon. A light snack may help you fall asleep. Warm milk and foods high in the amino acid tryptophan, such as bananas, may help you to sleep  Be sure to avoid heavy, spicy or sugary foods 4-6 hours before bedtime and avoid at snack time. Stay away from stimulants such as caffeine and nicotine for at least 4-6 hours before bed. Stimulants can interfere with your ability to fall asleep. Caffeine is found in tea, cola, coffee, cocoa and chocolate and is best avoided at bedtime. Nicotine is found in tobacco products. Avoid alcohol 4-6 hours before bedtime. Alcohol has an immediate sleep-inducing effect, after a few hours when alcohol levels fall there is a stimulant or wake-up effect and will cause fragmented sleep. Sleep rituals are important. Give your body clues it is time to slow down and sleep. Examples include; yoga, deep breathing, listen to relaxing music, a hot bath or a few minutes of reading.   Have a fixed bedtime and awakening time, Even on weekends! You will feel better keeping a regular sleep cycle, even if you are retired or not working. Get into your favorite sleep position. If not asleep in 30 minutes, get up and do something boring until you feel sleepy. Remember not to expose yourself to bright lights such as TV, phone or tablet screens. Only use your bed for sleeping. Do not use your bed as an office, workroom or recreation room. Use comfortable bedding. Uncomfortable bedding can prevent good sleep. Ensure your bedroom is quiet and comfortable. A cooler room along with enough blankets to stay warm is recommended. If your room is too noisy, try a white noise machine. If too bright, try black out shades or an eye mask. Dont take worries to bed. Leave worries about work, school etc. behind you when you go to bed. Some people find it helpful to assign a worry period in the evening or late afternoon to write down your worries and get them out of your system. CPAP Equipment Cleaning and Disinfecting Schedule  Equipment Cleaning Frequency Instructions  Disinfecting Frequency   Non-Disposable Filters  Weekly Mild soapy water, Rinse, Air Dry Not Required   Disposable Filters Change as needed  2-4 weeks Do Not Wash Not Required   Hose/tubing Daily Mild soapy water, Rinse, Air Dry Once a week   Mask / Nasal Pillows Daily Mild soapy water, Rinse, Air Dry Once a week   Headgear Weekly Hand wash, Mild soapy water, Rinse, Dry  Not Required   Humidifier Daily Empty water daily  Mild soapy water, Rinse well, Air Dry  Once a week   CPAP Unit As Needed Dust with damp cloth,  No detergents or sprays Not Required         Disinfect (per schedule) with 1 part white vinegar and 3 parts water- soak mask and water chamber for 30 minutes every 1-2 weeks, more often if sick. Allow water/vinegar mixture to run through tubing. Allow all equipment to air dry.    Drying Hints:   Always hang tubing away from direct sunlight, as this will cause the tubing to become yellow, brittle and crack over a period of time. DO NOT attach the wet tubing to your CPAP unit to blow-dry it. The moisture from the tubing can drain back into your machine. Moisture in your unit can cause sudden pressure increases or short circuits  DO's and DON'Ts:  - Don't use alcohol-based products to clean your mask, because it can cause the materials to become hard and brittle. - Don't put headgear in the washer or dryer  - Don't use any caustic or household cleaning solutions such as bleach on your CPAP   equipment.  - Do follow the recommended cleaning schedule. - Do change your disposable filter frequently. Adapted From: [employer]/cleaning. shtm.   These are general suggestions for all models please follow specific s recommendations and specific instructions

## 2019-04-08 NOTE — PROGRESS NOTES
MA Communication: The following orders are received by verbal communication from MAGDALENA Winkler.     Orders include:  Order faxed to [id number]       1 year fu scheduled 4/13/20

## 2019-05-24 DIAGNOSIS — M23.203 DEGENERATIVE TEAR OF MEDIAL MENISCUS OF RIGHT KNEE: ICD-10-CM

## 2019-05-24 DIAGNOSIS — M25.561 ACUTE PAIN OF RIGHT KNEE: ICD-10-CM

## 2019-05-29 RX ORDER — MELOXICAM 15 MG/1
15 TABLET ORAL DAILY
Qty: 30 TABLET | Refills: 0 | Status: SHIPPED | OUTPATIENT
Start: 2019-05-29 | End: 2019-06-22 | Stop reason: SDUPTHER

## 2019-06-22 DIAGNOSIS — M23.203 DEGENERATIVE TEAR OF MEDIAL MENISCUS OF RIGHT KNEE: ICD-10-CM

## 2019-06-22 DIAGNOSIS — M25.561 ACUTE PAIN OF RIGHT KNEE: ICD-10-CM

## 2019-06-24 RX ORDER — MELOXICAM 15 MG/1
15 TABLET ORAL DAILY
Qty: 30 TABLET | Refills: 0 | Status: SHIPPED | OUTPATIENT
Start: 2019-06-24 | End: 2019-07-28 | Stop reason: SDUPTHER

## 2019-07-28 DIAGNOSIS — M25.561 ACUTE PAIN OF RIGHT KNEE: ICD-10-CM

## 2019-07-28 DIAGNOSIS — M23.203 DEGENERATIVE TEAR OF MEDIAL MENISCUS OF RIGHT KNEE: ICD-10-CM

## 2019-07-29 RX ORDER — MELOXICAM 15 MG/1
15 TABLET ORAL DAILY
Qty: 30 TABLET | Refills: 0 | Status: SHIPPED | OUTPATIENT
Start: 2019-07-29 | End: 2019-08-27 | Stop reason: SDUPTHER

## 2019-08-27 DIAGNOSIS — M23.203 DEGENERATIVE TEAR OF MEDIAL MENISCUS OF RIGHT KNEE: ICD-10-CM

## 2019-08-27 DIAGNOSIS — M25.561 ACUTE PAIN OF RIGHT KNEE: ICD-10-CM

## 2019-08-27 RX ORDER — MELOXICAM 15 MG/1
15 TABLET ORAL DAILY
Qty: 30 TABLET | Refills: 0 | Status: SHIPPED | OUTPATIENT
Start: 2019-08-27 | End: 2019-10-26 | Stop reason: SDUPTHER

## 2019-10-26 DIAGNOSIS — M25.561 ACUTE PAIN OF RIGHT KNEE: ICD-10-CM

## 2019-10-26 DIAGNOSIS — M23.203 DEGENERATIVE TEAR OF MEDIAL MENISCUS OF RIGHT KNEE: ICD-10-CM

## 2019-10-28 RX ORDER — MELOXICAM 15 MG/1
15 TABLET ORAL DAILY
Qty: 30 TABLET | Refills: 0 | Status: SHIPPED | OUTPATIENT
Start: 2019-10-28 | End: 2019-11-25 | Stop reason: SDUPTHER

## 2019-11-15 ENCOUNTER — TELEPHONE (OUTPATIENT)
Dept: PULMONOLOGY | Age: 64
End: 2019-11-15

## 2019-11-15 DIAGNOSIS — G47.33 OSA (OBSTRUCTIVE SLEEP APNEA): Primary | ICD-10-CM

## 2019-11-25 DIAGNOSIS — M25.561 ACUTE PAIN OF RIGHT KNEE: ICD-10-CM

## 2019-11-25 DIAGNOSIS — M23.203 DEGENERATIVE TEAR OF MEDIAL MENISCUS OF RIGHT KNEE: ICD-10-CM

## 2019-11-25 RX ORDER — MELOXICAM 15 MG/1
15 TABLET ORAL DAILY
Qty: 30 TABLET | Refills: 0 | Status: SHIPPED | OUTPATIENT
Start: 2019-11-25 | End: 2020-01-28

## 2020-01-28 RX ORDER — MELOXICAM 15 MG/1
15 TABLET ORAL DAILY
Qty: 30 TABLET | Refills: 0 | Status: SHIPPED | OUTPATIENT
Start: 2020-01-28 | End: 2020-04-13

## 2020-04-08 ENCOUNTER — TELEPHONE (OUTPATIENT)
Dept: PULMONOLOGY | Age: 65
End: 2020-04-08

## 2020-04-08 NOTE — TELEPHONE ENCOUNTER
the terms described in the Terms of Service and this Telehealth Consent. The patient was read the following statement and has consented to the visit as of 4/8/20. The patient has been scheduled for their first telehealth visit on 4/13/20 with Sarah Ochoa CNP.

## 2020-05-06 ENCOUNTER — TELEPHONE (OUTPATIENT)
Dept: PULMONOLOGY | Age: 65
End: 2020-05-06

## 2020-05-06 NOTE — TELEPHONE ENCOUNTER
Within this Telehealth Consent, the terms you and yours refer to the person using the Telehealth Service (Service), or in the case of a use of the Service by or on behalf of a minor, you and yours refer to and include (i) the parent or legal guardian who provides consent to the use of the Service by such minor or uses the Service on behalf of such minor, and (ii) the minor for whom consent is being provided or on whose behalf the Service is being utilized. When using Service, you will be consulting with your health care providers via the use of Telehealth.   Telehealth involves the delivery of healthcare services using electronic communications, information technology or other means between a healthcare provider and a patient who are not in the same physical location. Telehealth may be used for diagnosis, treatment, follow-up and/or patient education, and may include, but is not limited to, one or more of the following:    Electronic transmission of medical records, photo images, personal health information or other data between a patient and a healthcare provider    Interactions between a patient and healthcare provider via audio, video and/or data communications    Use of output data from medical devices, sound and video files    Anticipated Benefits   The use of Telehealth by your Provider(s) through the Service may have the following possible benefits:    Making it easier and more efficient for you to access medical care and treatment for the conditions treated by such Provider(s) utilizing the Service    Allowing you to obtain medical care and treatment by Provider(s) at times that are convenient for you    Enabling you to interact with Provider(s) without the necessity of an in-office appointment     Possible Risks   While the use of Telehealth can provide potential benefits for you, there are also potential risks associated with the use of Telehealth.  These risks include, but may not be the terms described in the Terms of Service and this Telehealth Consent. The patient was read the following statement and has consented to the visit as of 5/6/20. The patient has been scheduled for their first telehealth visit on 5/12/2020 with Dr Cullen Hidalgo.

## 2020-05-12 ENCOUNTER — VIRTUAL VISIT (OUTPATIENT)
Dept: PULMONOLOGY | Age: 65
End: 2020-05-12
Payer: COMMERCIAL

## 2020-05-12 VITALS — HEIGHT: 66 IN | BODY MASS INDEX: 40.18 KG/M2 | WEIGHT: 250 LBS

## 2020-05-12 PROCEDURE — 99244 OFF/OP CNSLTJ NEW/EST MOD 40: CPT | Performed by: INTERNAL MEDICINE

## 2020-05-12 RX ORDER — ALBUTEROL SULFATE 90 UG/1
2 AEROSOL, METERED RESPIRATORY (INHALATION) EVERY 6 HOURS PRN
Qty: 1 INHALER | Refills: 5 | Status: SHIPPED | OUTPATIENT
Start: 2020-05-12

## 2020-05-12 NOTE — PROGRESS NOTES
Chief Complaint: COPD    Consulting provider: Dr. Amanuel Roldan    HPI: 62 y.o. female patient is being seen at the request of No ref. provider found for a consultation regarding COPD. The patient has a history of COPD and JAYY on Bipap. The patient does not have SOB at rest but has JOSEPH. Worse for 3 months. No inhaler use. Exercise tolerance on level ground is 1000 ft. The patient has a daily intermittent cough that is usually dry. The patient does wheeze intermittently most daily. The patient has smoked 30 pack years Quit in 2015    REVIEW OF SYSTEMS:    CONSTITUTIONAL: Also see HPI. Negative for fevers and chills and sweats. No unintentional weight loss more than 5 lbs. EYES: Also see HPI. no conjunctival injection or drainage. No itching or pain or tearing. ENT: Also see HPI. + post nasal drip, sinus pressure, nasal congestion/rhinorrhea, ear aches, snoring  RESPIRATORY:  Also see HPI. No hemoptysis or pleuritic pain. CARDIOVASCULAR: Negative for chest pain, palpitations, PND, orthopnea  GASTROINTESTINAL: Negative for acid reflux or heart burn. Negative for nausea, vomiting, difficulty swallowing, diarrhea, constipation and abdominal pain  MUSCULOSKELETAL:  No neck or back pain or arthritis. No arthralgias. No muscle weakness. HEMATOLOGICAL/LYMPH: Negative for adenopathy  SKIN:  No rash or nodules  EXTREMITIES: Negative for cyanosis or edema or raynaud's symptoms   NEUROLOGICAL: No Anxiety or depression. Negative for Syncope. Negative for seizures. Past Medical History:   Diagnosis Date    [facility] obstructive [facility]) ([facility] [address]     Diabetes mellitus ([facility] [address]     Hypertension     Primary osteoarthritis of right knee 6/21/2018    Sleep apnea      Past Surgical History      Procedure Laterality Date    CHOLECYSTECTOMY      COLPOSCOPY      KNEE SURGERY       Social History:    TOBACCO:   reports that she quit smoking about 5 years ago. Her smoking use included cigarettes.  She has a 30.00 pack-year smoking history. She has never used smokeless tobacco.  ETOH:   reports no history of alcohol use. Family History      Problem Relation Age of Onset    Hypertension Mother     Heart Failure Mother      Allergies:  is allergic to penicillins. Current Outpatient Medications:     Diclofenac (ZORVOLEX) 18 MG CAPS, Take 1 tablet by mouth 2 times daily as needed, Disp: , Rfl:     atorvastatin (LIPITOR) 10 MG tablet, Take 10 mg by mouth daily, Disp: , Rfl:     lisinopril (PRINIVIL;ZESTRIL) 20 MG tablet, TK 1 T PO QD, Disp: , Rfl: 0    escitalopram (LEXAPRO) 20 MG tablet, TK 1 T PO QD, Disp: , Rfl: 3    metFORMIN (GLUCOPHAGE-XR) 500 MG extended release tablet, TK 2 TS PO QD, Disp: , Rfl: 0    SITagliptin (JANUVIA) 50 MG tablet, Take 100 mg by mouth daily , Disp: , Rfl:     albuterol (PROAIR HFA) 108 (90 BASE) MCG/ACT inhaler, Inhale 2 puffs into the lungs every 6 hours as needed for Wheezing or Shortness of Breath (Patient not taking: Reported on 5/12/2020), Disp: 1 Inhaler, Rfl: 3    Objective:   PHYSICAL EXAM:   Ht 5' 6\" (1.676 m)   Wt 250 lb (113.4 kg)   BMI 40.35 kg/m²    Constitutional:  No acute distress. Appears well developed and nourished. Eyes: EOM intact. Conjunctivae anicteric. No visible discharge. HENT: Head is normocephalic and atraumatic. Mucus membranes are moist and the tongue appears normal. Normal appearing nose. External Ears normal.   Neck: No obvious mass and the trachea is midline. Respiratory: No accessory muscle usage. Respiratory effort normal. No visualized signs of difficulty breathing or respiratory distress  Cardiovascular: No LE edema. Skin: No significant exanthematous lesions or discoloration noted on facial skin. Musculoskeletal:  No digit clubbing or cyanosis. Normal sitting station. Psychiatric: No anxiety or Agitation. Alert and Oriented to person, place and time. Normal judgement and insight.     LABS:  The recent relevant labs were reviewed  A1AT

## 2020-06-30 ENCOUNTER — VIRTUAL VISIT (OUTPATIENT)
Dept: PULMONOLOGY | Age: 65
End: 2020-06-30
Payer: COMMERCIAL

## 2020-06-30 PROCEDURE — 99213 OFFICE O/P EST LOW 20 MIN: CPT | Performed by: NURSE PRACTITIONER

## 2020-06-30 ASSESSMENT — SLEEP AND FATIGUE QUESTIONNAIRES
ESS TOTAL SCORE: 7
HOW LIKELY ARE YOU TO NOD OFF OR FALL ASLEEP WHEN YOU ARE A PASSENGER IN A CAR FOR AN HOUR WITHOUT A BREAK: 0
HOW LIKELY ARE YOU TO NOD OFF OR FALL ASLEEP WHILE SITTING AND READING: 2
HOW LIKELY ARE YOU TO NOD OFF OR FALL ASLEEP WHILE SITTING QUIETLY AFTER LUNCH WITHOUT ALCOHOL: 0
HOW LIKELY ARE YOU TO NOD OFF OR FALL ASLEEP WHILE SITTING AND TALKING TO SOMEONE: 0
HOW LIKELY ARE YOU TO NOD OFF OR FALL ASLEEP WHILE SITTING INACTIVE IN A PUBLIC PLACE: 1
HOW LIKELY ARE YOU TO NOD OFF OR FALL ASLEEP IN A CAR, WHILE STOPPED FOR A FEW MINUTES IN TRAFFIC: 0
HOW LIKELY ARE YOU TO NOD OFF OR FALL ASLEEP WHILE WATCHING TV: 2
HOW LIKELY ARE YOU TO NOD OFF OR FALL ASLEEP WHILE LYING DOWN TO REST IN THE AFTERNOON WHEN CIRCUMSTANCES PERMIT: 2

## 2020-06-30 NOTE — PATIENT INSTRUCTIONS
Here are some tips to to getting better sleep  1- Avoid napping during the day: This will ensure you are tired at bedtime. If you have to take a nap, sleep less than one hour, before 3 pm.   2- Exercise regularly, but not right before bed: but the timing of the workout is important. Exercising in the morning or early afternoon will not interfere with sleep. Exercising within two hours before bedtime can decrease your ability to fall asleep. Regular exercise is recommended to help you deepen the sleep. 3- Avoid heavy, spicy, or sugary foods 4-6 hours before bedtime: These can affect your ability to stay asleep. 4- Have a light snack before bed: Having an empty stomach can interfere with your sleep. Dairy products and turkey contain tryptophan, which acts as a natural sleep inducer. 5- Stay away from caffeine, nicotine and alcohol at least 4-6 hours before bed: Caffeine and nicotine are stimulants that interfere with your ability to fall asleep. While alcohol has an immediate sleep-inducing effect, a few hours later, as alcohol levels in your blood start to fall, there is a stimulant effect and you will experience fragmented sleep. 6- Take a hot bath 90 minutes before bedtime:  A hot bath will raise your body temperature, but it is the drop in body temperature that may leave you feeling sleepy  7- Develop sleep rituals: it is important to give your body cues that it is time to slow down and sleep. Listen to relaxing music, read something soothing for 15 minutes, have a cup of caffeine free tea, or do relaxation exercises such as yoga or deep breathing help relieve anxiety and reduce muscle tension. 8- Fix a bedtime and an awakening time: Even on weekends! When your sleep cycle has a regular rhythm, you will feel better. 9- Sleep only when sleepy: This reduces the time you are awake in bed.    10- Get into your favorite sleeping position: If you can't fall asleep within 15-30 minutes, get up and do

## 2020-07-09 ENCOUNTER — OFFICE VISIT (OUTPATIENT)
Dept: PRIMARY CARE CLINIC | Age: 65
End: 2020-07-09
Payer: COMMERCIAL

## 2020-07-09 PROCEDURE — 99211 OFF/OP EST MAY X REQ PHY/QHP: CPT | Performed by: PHYSICIAN ASSISTANT

## 2020-07-13 ENCOUNTER — HOSPITAL ENCOUNTER (OUTPATIENT)
Dept: PULMONOLOGY | Age: 65
Discharge: HOME | End: 2020-07-13
Payer: COMMERCIAL

## 2020-07-13 ENCOUNTER — HOSPITAL ENCOUNTER (OUTPATIENT)
Dept: CT IMAGING | Age: 65
Discharge: HOME | End: 2020-07-13
Payer: COMMERCIAL

## 2020-07-13 LAB — SARS-COV-2: NOT DETECTED

## 2020-07-13 PROCEDURE — 94618 PULMONARY STRESS TESTING: CPT

## 2020-07-13 PROCEDURE — G0297 LDCT FOR LUNG CA SCREEN: HCPCS

## 2020-07-14 NOTE — PROCEDURES
[address]                 [address] [place] [address]                               PULMONARY FUNCTION    PATIENT NAME: Thee Luna                   :        1955  MED REC NO:   [id number]                          ROOM:  ACCOUNT NO:   [de-identified]                           ADMIT DATE: 2020  PROVIDER:     Twin Todd MD    DATE OF PROCEDURE:  2020    SIX-MINUTE WALK    INDICATION:  COPD. Six-minute walk was done per [facility] protocol. The  patient was able to walk 840 feet. Saturation on room air at rest was  94% with the heart rate of 89. Desaturation on exertion to 87% required  1 liter of oxygen to keep saturation above 88%. Max heart rate of 123. CONCLUSION:  1. Hypoxia on exertion, requires 1 liter to keep saturation above 88%. 2.  Tachycardia on exertion, max heart rate of 123. Clinical  correlation is warranted.         Beth Martinez MD    D: 2020 16:04:49       T: 2020 20:52:18     [id number]  Job#: [id number]     Doc#: [id number]    CC:

## 2020-07-15 ENCOUNTER — HOSPITAL ENCOUNTER (OUTPATIENT)
Dept: PULMONOLOGY | Age: 65
Discharge: HOME | End: 2020-07-15
Payer: COMMERCIAL

## 2020-07-15 VITALS — OXYGEN SATURATION: 93 %

## 2020-07-15 LAB
DLCO %PRED: 57 %
DLCO PRED: NORMAL
DLCO/VA %PRED: NORMAL
DLCO/VA PRED: NORMAL
DLCO/VA: NORMAL
DLCO: NORMAL
EXPIRATORY TIME-POST: NORMAL
EXPIRATORY TIME: NORMAL
FEF 25-75% %CHNG: NORMAL
FEF 25-75% %PRED-POST: NORMAL
FEF 25-75% %PRED-PRE: NORMAL
FEF 25-75% PRED: NORMAL
FEF 25-75%-POST: NORMAL
FEF 25-75%-PRE: NORMAL
FEV1 %PRED-POST: 45 %
FEV1 %PRED-PRE: 42 %
FEV1 PRED: NORMAL
FEV1-POST: NORMAL
FEV1-PRE: NORMAL
FEV1/FVC %PRED-POST: NORMAL
FEV1/FVC %PRED-PRE: NORMAL
FEV1/FVC PRED: NORMAL
FEV1/FVC-POST: 57 %
FEV1/FVC-PRE: 57 %
FVC %PRED-POST: NORMAL
FVC %PRED-PRE: NORMAL
FVC PRED: NORMAL
FVC-POST: NORMAL
FVC-PRE: NORMAL
GAW %PRED: NORMAL
GAW PRED: NORMAL
GAW: NORMAL
IC %PRED: NORMAL
IC PRED: NORMAL
IC: NORMAL
MEP: NORMAL
MIP: NORMAL
MVV %PRED-PRE: NORMAL
MVV PRED: NORMAL
MVV-PRE: NORMAL
PEF %PRED-POST: NORMAL
PEF %PRED-PRE: NORMAL
PEF PRED: NORMAL
PEF%CHNG: NORMAL
PEF-POST: NORMAL
PEF-PRE: NORMAL
RAW %PRED: NORMAL
RAW PRED: NORMAL
RAW: NORMAL
RV %PRED: NORMAL
RV PRED: NORMAL
RV: NORMAL
SVC %PRED: NORMAL
SVC PRED: NORMAL
SVC: NORMAL
TLC %PRED: 91 %
TLC PRED: NORMAL
TLC: NORMAL
VA %PRED: NORMAL
VA PRED: NORMAL
VA: NORMAL
VTG %PRED: NORMAL
VTG PRED: NORMAL
VTG: NORMAL

## 2020-07-15 PROCEDURE — 94060 EVALUATION OF WHEEZING: CPT

## 2020-07-15 PROCEDURE — 94640 AIRWAY INHALATION TREATMENT: CPT

## 2020-07-15 PROCEDURE — 94760 N-INVAS EAR/PLS OXIMETRY 1: CPT

## 2020-07-15 PROCEDURE — 94726 PLETHYSMOGRAPHY LUNG VOLUMES: CPT

## 2020-07-15 PROCEDURE — 94729 DIFFUSING CAPACITY: CPT

## 2020-07-15 PROCEDURE — 6370000000 HC RX 637 (ALT 250 FOR IP): Performed by: INTERNAL MEDICINE

## 2020-07-15 RX ORDER — ALBUTEROL SULFATE 90 UG/1
2 AEROSOL, METERED RESPIRATORY (INHALATION) ONCE
Status: COMPLETED | OUTPATIENT
Start: 2020-07-15 | End: 2020-07-15

## 2020-07-15 RX ADMIN — Medication 2 PUFF: at 14:13

## 2020-07-15 ASSESSMENT — PULMONARY FUNCTION TESTS
FEV1_PERCENT_PREDICTED_PRE: 42
FEV1/FVC_POST: 57
FEV1_PERCENT_PREDICTED_POST: 45
FEV1/FVC_PRE: 57

## 2020-07-17 NOTE — PROCEDURES
[address]                 [address] [place] [address]                               PULMONARY FUNCTION    PATIENT NAME: Cristofer Stone                   :        1955  MED REC NO:   [id number]                          ROOM:  ACCOUNT NO:   [de-identified]                           ADMIT DATE: 07/15/2020  PROVIDER:     Leanne Smith MD    DATE OF PROCEDURE:  07/15/2020    INDICATION:  COPD. FINDINGS:  1. Spirometry revealed evidence of severe obstructive defect. FEV1 is  1.11 liters, which is 42% of predicted. No significant response to  bronchodilators. FEV1/FVC ratio is 57%. 2.  Lung volume revealed normal total lung capacity of 4.99 liters,  which is 91% of predicted. Evidence of air trapping with residual  volume of 2.98 liters, which is 145% of predicted. 3.  Diffusion capacity is moderately decreased at 13.8, which is 57% of  predicted. 4.  Flow volume loops consistent with obstructive defect. CONCLUSION:  1. Severe obstructive defect with air trapping and moderately decreased  diffusion capacity. 2.  No bronchodilator response.         Alek Moore MD    D: 2020 15:39:50       T: 2020 16:44:09     [email or web address]  Job#: [id number]     Doc#: [id number]    CC:

## 2020-07-21 ENCOUNTER — VIRTUAL VISIT (OUTPATIENT)
Dept: PULMONOLOGY | Age: 65
End: 2020-07-21
Payer: COMMERCIAL

## 2020-07-21 PROCEDURE — 99214 OFFICE O/P EST MOD 30 MIN: CPT | Performed by: INTERNAL MEDICINE

## 2020-07-21 RX ORDER — UMECLIDINIUM BROMIDE AND VILANTEROL TRIFENATATE 62.5; 25 UG/1; UG/1
1 POWDER RESPIRATORY (INHALATION) DAILY
Qty: 1 EACH | Refills: 5 | Status: SHIPPED | OUTPATIENT
Start: 2020-07-21 | End: 2021-03-22 | Stop reason: SDUPTHER

## 2020-07-21 NOTE — PROGRESS NOTES
[facility]    Outpatient Follow Up Note    Chief Complaint: COPD  Consulting provider: Xenia Jenkins MD    Interval History: 60 y.o. female SOB is stable, intermittent wheeze      Initial HPI:The patient has a history of COPD and JAYY on Bipap. The patient does not have SOB at rest but has JOSEPH. Worse for 3 months. No inhaler use. Exercise tolerance on level ground is 1000 ft. The patient has a daily intermittent cough that is usually dry. The patient does wheeze intermittently most daily.      The patient has smoked 30 pack years Quit in 2015      Current Outpatient Medications:     albuterol sulfate HFA (PROAIR HFA) 108 (90 Base) MCG/ACT inhaler, Inhale 2 puffs into the lungs every 6 hours as needed for Wheezing or Shortness of Breath, Disp: 1 Inhaler, Rfl: 5    Diclofenac (ZORVOLEX) 18 MG CAPS, Take 1 tablet by mouth 2 times daily as needed, Disp: , Rfl:     atorvastatin (LIPITOR) 10 MG tablet, Take 10 mg by mouth daily, Disp: , Rfl:     lisinopril (PRINIVIL;ZESTRIL) 20 MG tablet, TK 1 T PO QD, Disp: , Rfl: 0    escitalopram (LEXAPRO) 20 MG tablet, TK 1 T PO QD, Disp: , Rfl: 3    metFORMIN (GLUCOPHAGE-XR) 500 MG extended release tablet, TK 2 TS PO QD, Disp: , Rfl: 0    SITagliptin (JANUVIA) 50 MG tablet, Take 100 mg by mouth daily , Disp: , Rfl:     Objective:   PHYSICAL EXAM: There were no vitals taken for this visit. Constitutional:  No acute distress. Appears well developed and nourished. Eyes: EOM intact. Conjunctivae anicteric. No visible discharge. HENT: Head is normocephalic and atraumatic. Mucus membranes are moist and the tongue appears normal. Normal appearing nose. External Ears normal.   Neck: No obvious mass and the trachea is midline. Respiratory: No accessory muscle usage. Respiratory effort normal. No visualized signs of difficulty breathing or respiratory distress  Psychiatric: No anxiety or Agitation. Alert and Oriented to person, place and time. Normal judgement and insight. LABS:  Reviewed any pertinent new labs that are available. A1AT 141     PFTs 5/19/15  FVC 2.00l (57%) FEV1 1. 32L  (49%) FEV1/FVC ratio 66 TLC (113%) RV (202%) DLCO (68%) Bronchodilator response: yes  6MWT:  7991 ft and down to 90% on room air  7/17/20  FVC  (%) FEV1 1.22L (42%) FEV1/FVC ratio 0.57 TLC (91%) RV (145%) DLCO (57%) Bronchodilator response: no    6MWT: 840ft, 1lpm    IMAGING:  I personally reviewed and interpreted the following imaging today in the office:   7/13/20   Chest CT:  Lungs/pleura: The tracheobronchial tree is patent. Lovina Jodie is no pneumothorax    or pleural effusion.  There is biapical and bibasilar scarring.         There are no suspicious pulmonary nodules.         ASSESSMENT:  · COPD  · Chronic hypoxic respiratory failure  · Morbid obesity  · DM2  · HTN/HLD  · JAYY on Bipap 19/5 with suboptimal compliance per sleep clinic notes     PLAN:   · Start Anoro  · Start 1lpm o2 with home concetrator and portable conentrator   · Continue PRN albuterol  · Weight loss and exercise  · Screening CT scan was considered in a lung cancer screening counseling and shared decision making visit today that included the following elements:   · Eligibility: Age: 62.  There are no signs or symptoms of lung cancer.   Tobacco History 30 pack-years, quit 2015 years ago  · Verbal counseling has been performed by me to include benefits and harms of screening, follow-up diagnostic testing, over-diagnosis, false positive rate, and total radiation exposure;   · I have counseled on the importance of adherence to annual lung cancer LDCT screening, the impact of comorbidities and patient is willing to undergo diagnosis and treatment;   · I have provided counseling on the importance of maintaining cigarette smoking abstinence if former smoker; or the importance of smoking cessation if current smoker and, if appropriate, furnishing of information about tobacco cessation interventions; and   · I have furnished a written order for lung cancer screening with LDCT. · Order for Screening chest CT scan should be placed with documentation as below:  · Beneficiary date of birth;   · Actual pack - year smoking history (number) from above;   · Current smoking status, and for former smokers, the number of years since quitting smoking from above  · Beneficiary is asymptomatic   · National Provider Identifier (NPI) for Dr. German Palencia. [place]  Pursuant to the emergency declaration under the [address] [place] [address], Critical access hospital waiver authority and the [employer] Act, this Virtual  Visit was conducted, with patient's consent, to reduce the patient's risk of exposure to COVID-19 and provide continuity of care for an established patient. Services were provided through a video synchronous discussion virtually to substitute for in-person clinic visit.

## 2020-09-21 ENCOUNTER — APPOINTMENT (OUTPATIENT)
Dept: CT IMAGING | Age: 65
DRG: 683 | End: 2020-09-21
Payer: COMMERCIAL

## 2020-09-21 ENCOUNTER — APPOINTMENT (OUTPATIENT)
Dept: GENERAL RADIOLOGY | Age: 65
DRG: 683 | End: 2020-09-21
Payer: COMMERCIAL

## 2020-09-21 ENCOUNTER — HOSPITAL ENCOUNTER (INPATIENT)
Age: 65
LOS: 1 days | Discharge: HOME | DRG: 683 | End: 2020-09-22
Attending: STUDENT IN AN ORGANIZED HEALTH CARE EDUCATION/TRAINING PROGRAM | Admitting: INTERNAL MEDICINE
Payer: COMMERCIAL

## 2020-09-21 PROBLEM — J96.00 ACUTE RESPIRATORY FAILURE (HCC): Status: ACTIVE | Noted: 2020-09-21

## 2020-09-21 LAB
A/G RATIO: 1 (ref 1.1–2.2)
ALBUMIN SERPL-MCNC: 4 G/DL (ref 3.4–5)
ALP BLD-CCNC: 90 U/L (ref 40–129)
ALT SERPL-CCNC: 28 U/L (ref 10–40)
AMORPHOUS: ABNORMAL /HPF
ANION GAP SERPL CALCULATED.3IONS-SCNC: 14 MMOL/L (ref 3–16)
APTT: 32.2 SEC (ref 24.2–36.2)
AST SERPL-CCNC: 31 U/L (ref 15–37)
BACTERIA: ABNORMAL /HPF
BASOPHILS ABSOLUTE: 0 K/UL (ref 0–0.2)
BASOPHILS RELATIVE PERCENT: 0.7 %
BILIRUB SERPL-MCNC: 0.3 MG/DL (ref 0–1)
BILIRUBIN URINE: NEGATIVE
BLOOD, URINE: NEGATIVE
BUN BLDV-MCNC: 22 MG/DL (ref 7–20)
CALCIUM SERPL-MCNC: 10.1 MG/DL (ref 8.3–10.6)
CHLORIDE BLD-SCNC: 99 MMOL/L (ref 99–110)
CLARITY: CLEAR
CO2: 25 MMOL/L (ref 21–32)
COLOR: YELLOW
CREAT SERPL-MCNC: 1.4 MG/DL (ref 0.6–1.2)
EKG ATRIAL RATE: 95 BPM
EKG DIAGNOSIS: NORMAL
EKG P AXIS: 77 DEGREES
EKG P-R INTERVAL: 148 MS
EKG Q-T INTERVAL: 396 MS
EKG QRS DURATION: 70 MS
EKG QTC CALCULATION (BAZETT): 497 MS
EKG R AXIS: 11 DEGREES
EKG T AXIS: 27 DEGREES
EKG VENTRICULAR RATE: 95 BPM
EOSINOPHILS ABSOLUTE: 0.1 K/UL (ref 0–0.6)
EOSINOPHILS RELATIVE PERCENT: 1.7 %
EPITHELIAL CELLS, UA: ABNORMAL /HPF (ref 0–5)
FIBRINOGEN: 333 MG/DL (ref 200–397)
GFR AFRICAN AMERICAN: 46
GFR NON-AFRICAN AMERICAN: 38
GLOBULIN: 3.9 G/DL
GLUCOSE BLD-MCNC: 209 MG/DL (ref 70–99)
GLUCOSE BLD-MCNC: 274 MG/DL (ref 70–99)
GLUCOSE BLD-MCNC: 286 MG/DL (ref 70–99)
GLUCOSE URINE: 500 MG/DL
HCT VFR BLD CALC: 37 % (ref 36–48)
HEMOGLOBIN: 12.4 G/DL (ref 12–16)
INR BLD: 1.01 (ref 0.86–1.14)
KETONES, URINE: NEGATIVE MG/DL
LACTATE DEHYDROGENASE: 183 U/L (ref 100–190)
LEUKOCYTE ESTERASE, URINE: ABNORMAL
LYMPHOCYTES ABSOLUTE: 2.3 K/UL (ref 1–5.1)
LYMPHOCYTES RELATIVE PERCENT: 36.1 %
MCH RBC QN AUTO: 31.6 PG (ref 26–34)
MCHC RBC AUTO-ENTMCNC: 33.4 G/DL (ref 31–36)
MCV RBC AUTO: 94.5 FL (ref 80–100)
MICROSCOPIC EXAMINATION: YES
MONOCYTES ABSOLUTE: 0.4 K/UL (ref 0–1.3)
MONOCYTES RELATIVE PERCENT: 5.6 %
NEUTROPHILS ABSOLUTE: 3.5 K/UL (ref 1.7–7.7)
NEUTROPHILS RELATIVE PERCENT: 55.9 %
NITRITE, URINE: NEGATIVE
PDW BLD-RTO: 14.4 % (ref 12.4–15.4)
PERFORMED ON: ABNORMAL
PERFORMED ON: ABNORMAL
PH UA: 5.5 (ref 5–8)
PLATELET # BLD: 231 K/UL (ref 135–450)
PMV BLD AUTO: 8.8 FL (ref 5–10.5)
POTASSIUM REFLEX MAGNESIUM: 3.8 MMOL/L (ref 3.5–5.1)
PRO-BNP: 73 PG/ML (ref 0–124)
PROCALCITONIN: 0.24 NG/ML (ref 0–0.15)
PROTEIN UA: NEGATIVE MG/DL
PROTHROMBIN TIME: 11.7 SEC (ref 10–13.2)
RBC # BLD: 3.91 M/UL (ref 4–5.2)
RBC UA: ABNORMAL /HPF (ref 0–4)
S PYO AG THROAT QL: NEGATIVE
SARS-COV-2, PCR: NOT DETECTED
SODIUM BLD-SCNC: 138 MMOL/L (ref 136–145)
SPECIFIC GRAVITY UA: 1.01 (ref 1–1.03)
TOTAL PROTEIN: 7.9 G/DL (ref 6.4–8.2)
TROPONIN: <0.01 NG/ML
URINE REFLEX TO CULTURE: YES
URINE TYPE: ABNORMAL
UROBILINOGEN, URINE: 0.2 E.U./DL
WBC # BLD: 6.3 K/UL (ref 4–11)
WBC UA: ABNORMAL /HPF (ref 0–5)

## 2020-09-21 PROCEDURE — 6360000002 HC RX W HCPCS: Performed by: PHYSICIAN ASSISTANT

## 2020-09-21 PROCEDURE — 2580000003 HC RX 258: Performed by: PHYSICIAN ASSISTANT

## 2020-09-21 PROCEDURE — 84484 ASSAY OF TROPONIN QUANT: CPT

## 2020-09-21 PROCEDURE — 93010 ELECTROCARDIOGRAM REPORT: CPT | Performed by: INTERNAL MEDICINE

## 2020-09-21 PROCEDURE — 94761 N-INVAS EAR/PLS OXIMETRY MLT: CPT

## 2020-09-21 PROCEDURE — 81001 URINALYSIS AUTO W/SCOPE: CPT

## 2020-09-21 PROCEDURE — 85025 COMPLETE CBC W/AUTO DIFF WBC: CPT

## 2020-09-21 PROCEDURE — 87081 CULTURE SCREEN ONLY: CPT

## 2020-09-21 PROCEDURE — 99285 EMERGENCY DEPT VISIT HI MDM: CPT

## 2020-09-21 PROCEDURE — 83880 ASSAY OF NATRIURETIC PEPTIDE: CPT

## 2020-09-21 PROCEDURE — 93005 ELECTROCARDIOGRAM TRACING: CPT | Performed by: PHYSICIAN ASSISTANT

## 2020-09-21 PROCEDURE — 86900 BLOOD TYPING SEROLOGIC ABO: CPT

## 2020-09-21 PROCEDURE — 83036 HEMOGLOBIN GLYCOSYLATED A1C: CPT

## 2020-09-21 PROCEDURE — 96372 THER/PROPH/DIAG INJ SC/IM: CPT

## 2020-09-21 PROCEDURE — 6370000000 HC RX 637 (ALT 250 FOR IP): Performed by: INTERNAL MEDICINE

## 2020-09-21 PROCEDURE — 85384 FIBRINOGEN ACTIVITY: CPT

## 2020-09-21 PROCEDURE — 85730 THROMBOPLASTIN TIME PARTIAL: CPT

## 2020-09-21 PROCEDURE — 99222 1ST HOSP IP/OBS MODERATE 55: CPT | Performed by: INTERNAL MEDICINE

## 2020-09-21 PROCEDURE — 85610 PROTHROMBIN TIME: CPT

## 2020-09-21 PROCEDURE — 83615 LACTATE (LD) (LDH) ENZYME: CPT

## 2020-09-21 PROCEDURE — 36415 COLL VENOUS BLD VENIPUNCTURE: CPT

## 2020-09-21 PROCEDURE — U0003 INFECTIOUS AGENT DETECTION BY NUCLEIC ACID (DNA OR RNA); SEVERE ACUTE RESPIRATORY SYNDROME CORONAVIRUS 2 (SARS-COV-2) (CORONAVIRUS DISEASE [COVID-19]), AMPLIFIED PROBE TECHNIQUE, MAKING USE OF HIGH THROUGHPUT TECHNOLOGIES AS DESCRIBED BY CMS-2020-01-R: HCPCS

## 2020-09-21 PROCEDURE — 87880 STREP A ASSAY W/OPTIC: CPT

## 2020-09-21 PROCEDURE — 84145 PROCALCITONIN (PCT): CPT

## 2020-09-21 PROCEDURE — G0378 HOSPITAL OBSERVATION PER HR: HCPCS

## 2020-09-21 PROCEDURE — 87077 CULTURE AEROBIC IDENTIFY: CPT

## 2020-09-21 PROCEDURE — 70450 CT HEAD/BRAIN W/O DYE: CPT

## 2020-09-21 PROCEDURE — 80053 COMPREHEN METABOLIC PANEL: CPT

## 2020-09-21 PROCEDURE — 82728 ASSAY OF FERRITIN: CPT

## 2020-09-21 PROCEDURE — 96374 THER/PROPH/DIAG INJ IV PUSH: CPT

## 2020-09-21 PROCEDURE — 96361 HYDRATE IV INFUSION ADD-ON: CPT

## 2020-09-21 PROCEDURE — 6370000000 HC RX 637 (ALT 250 FOR IP): Performed by: PHYSICIAN ASSISTANT

## 2020-09-21 PROCEDURE — 86901 BLOOD TYPING SEROLOGIC RH(D): CPT

## 2020-09-21 PROCEDURE — 1200000000 HC SEMI PRIVATE

## 2020-09-21 PROCEDURE — 2700000000 HC OXYGEN THERAPY PER DAY

## 2020-09-21 PROCEDURE — 86850 RBC ANTIBODY SCREEN: CPT

## 2020-09-21 PROCEDURE — 71045 X-RAY EXAM CHEST 1 VIEW: CPT

## 2020-09-21 PROCEDURE — 87086 URINE CULTURE/COLONY COUNT: CPT

## 2020-09-21 RX ORDER — ACETAMINOPHEN 325 MG/1
650 TABLET ORAL EVERY 6 HOURS PRN
Status: DISCONTINUED | OUTPATIENT
Start: 2020-09-21 | End: 2020-09-22 | Stop reason: HOSPADM

## 2020-09-21 RX ORDER — DEXTROSE MONOHYDRATE 25 G/50ML
12.5 INJECTION, SOLUTION INTRAVENOUS PRN
Status: DISCONTINUED | OUTPATIENT
Start: 2020-09-21 | End: 2020-09-22 | Stop reason: HOSPADM

## 2020-09-21 RX ORDER — DEXTROSE MONOHYDRATE 50 MG/ML
100 INJECTION, SOLUTION INTRAVENOUS PRN
Status: DISCONTINUED | OUTPATIENT
Start: 2020-09-21 | End: 2020-09-21 | Stop reason: SDUPTHER

## 2020-09-21 RX ORDER — ACETAMINOPHEN 650 MG/1
650 SUPPOSITORY RECTAL EVERY 6 HOURS PRN
Status: DISCONTINUED | OUTPATIENT
Start: 2020-09-21 | End: 2020-09-22 | Stop reason: HOSPADM

## 2020-09-21 RX ORDER — POLYETHYLENE GLYCOL 3350 17 G/17G
17 POWDER, FOR SOLUTION ORAL DAILY PRN
Status: DISCONTINUED | OUTPATIENT
Start: 2020-09-21 | End: 2020-09-22 | Stop reason: HOSPADM

## 2020-09-21 RX ORDER — SODIUM CHLORIDE 0.9 % (FLUSH) 0.9 %
10 SYRINGE (ML) INJECTION EVERY 12 HOURS SCHEDULED
Status: DISCONTINUED | OUTPATIENT
Start: 2020-09-21 | End: 2020-09-22 | Stop reason: HOSPADM

## 2020-09-21 RX ORDER — ATORVASTATIN CALCIUM 10 MG/1
10 TABLET, FILM COATED ORAL DAILY
Status: DISCONTINUED | OUTPATIENT
Start: 2020-09-21 | End: 2020-09-22 | Stop reason: HOSPADM

## 2020-09-21 RX ORDER — ONDANSETRON 2 MG/ML
4 INJECTION INTRAMUSCULAR; INTRAVENOUS ONCE
Status: COMPLETED | OUTPATIENT
Start: 2020-09-21 | End: 2020-09-21

## 2020-09-21 RX ORDER — DEXTROSE MONOHYDRATE 50 MG/ML
100 INJECTION, SOLUTION INTRAVENOUS PRN
Status: DISCONTINUED | OUTPATIENT
Start: 2020-09-21 | End: 2020-09-22 | Stop reason: HOSPADM

## 2020-09-21 RX ORDER — DOXYCYCLINE HYCLATE 100 MG
100 TABLET ORAL EVERY 12 HOURS SCHEDULED
Status: DISCONTINUED | OUTPATIENT
Start: 2020-09-21 | End: 2020-09-22 | Stop reason: HOSPADM

## 2020-09-21 RX ORDER — PROMETHAZINE HYDROCHLORIDE 25 MG/1
12.5 TABLET ORAL EVERY 6 HOURS PRN
Status: DISCONTINUED | OUTPATIENT
Start: 2020-09-21 | End: 2020-09-22 | Stop reason: HOSPADM

## 2020-09-21 RX ORDER — ONDANSETRON 2 MG/ML
4 INJECTION INTRAMUSCULAR; INTRAVENOUS EVERY 6 HOURS PRN
Status: DISCONTINUED | OUTPATIENT
Start: 2020-09-21 | End: 2020-09-22 | Stop reason: HOSPADM

## 2020-09-21 RX ORDER — SODIUM CHLORIDE 9 MG/ML
INJECTION, SOLUTION INTRAVENOUS CONTINUOUS
Status: DISCONTINUED | OUTPATIENT
Start: 2020-09-21 | End: 2020-09-22 | Stop reason: HOSPADM

## 2020-09-21 RX ORDER — NICOTINE POLACRILEX 4 MG
15 LOZENGE BUCCAL PRN
Status: DISCONTINUED | OUTPATIENT
Start: 2020-09-21 | End: 2020-09-21 | Stop reason: SDUPTHER

## 2020-09-21 RX ORDER — SODIUM CHLORIDE 0.9 % (FLUSH) 0.9 %
10 SYRINGE (ML) INJECTION PRN
Status: DISCONTINUED | OUTPATIENT
Start: 2020-09-21 | End: 2020-09-22 | Stop reason: HOSPADM

## 2020-09-21 RX ORDER — NICOTINE POLACRILEX 4 MG
15 LOZENGE BUCCAL PRN
Status: DISCONTINUED | OUTPATIENT
Start: 2020-09-21 | End: 2020-09-22 | Stop reason: HOSPADM

## 2020-09-21 RX ORDER — ALBUTEROL SULFATE 90 UG/1
2 AEROSOL, METERED RESPIRATORY (INHALATION) EVERY 6 HOURS PRN
Status: DISCONTINUED | OUTPATIENT
Start: 2020-09-21 | End: 2020-09-22 | Stop reason: HOSPADM

## 2020-09-21 RX ORDER — DEXTROSE MONOHYDRATE 25 G/50ML
12.5 INJECTION, SOLUTION INTRAVENOUS PRN
Status: DISCONTINUED | OUTPATIENT
Start: 2020-09-21 | End: 2020-09-21 | Stop reason: SDUPTHER

## 2020-09-21 RX ORDER — 0.9 % SODIUM CHLORIDE 0.9 %
1000 INTRAVENOUS SOLUTION INTRAVENOUS ONCE
Status: COMPLETED | OUTPATIENT
Start: 2020-09-21 | End: 2020-09-21

## 2020-09-21 RX ADMIN — SODIUM CHLORIDE: 9 INJECTION, SOLUTION INTRAVENOUS at 21:35

## 2020-09-21 RX ADMIN — ACETAMINOPHEN 650 MG: 325 TABLET ORAL at 21:35

## 2020-09-21 RX ADMIN — SODIUM CHLORIDE 1000 ML: 9 INJECTION, SOLUTION INTRAVENOUS at 12:13

## 2020-09-21 RX ADMIN — Medication 1 SPRAY: at 21:36

## 2020-09-21 RX ADMIN — ONDANSETRON HYDROCHLORIDE 4 MG: 2 INJECTION, SOLUTION INTRAMUSCULAR; INTRAVENOUS at 12:22

## 2020-09-21 RX ADMIN — ATORVASTATIN CALCIUM 10 MG: 10 TABLET, FILM COATED ORAL at 18:13

## 2020-09-21 RX ADMIN — PROMETHAZINE HYDROCHLORIDE 12.5 MG: 25 TABLET ORAL at 21:34

## 2020-09-21 RX ADMIN — ENOXAPARIN SODIUM 40 MG: 40 INJECTION SUBCUTANEOUS at 18:13

## 2020-09-21 RX ADMIN — SODIUM CHLORIDE: 9 INJECTION, SOLUTION INTRAVENOUS at 18:14

## 2020-09-21 RX ADMIN — DOXYCYCLINE HYCLATE 100 MG: 100 TABLET, COATED ORAL at 21:34

## 2020-09-21 RX ADMIN — INSULIN LISPRO 3 UNITS: 100 INJECTION, SOLUTION INTRAVENOUS; SUBCUTANEOUS at 18:31

## 2020-09-21 ASSESSMENT — PAIN DESCRIPTION - PROGRESSION: CLINICAL_PROGRESSION: GRADUALLY WORSENING

## 2020-09-21 ASSESSMENT — ENCOUNTER SYMPTOMS
SHORTNESS OF BREATH: 0
COUGH: 0
EYE REDNESS: 0
CONSTIPATION: 0
NAUSEA: 0
SINUS PRESSURE: 0
VOMITING: 0
RHINORRHEA: 0
ABDOMINAL PAIN: 0
SORE THROAT: 1
CHEST TIGHTNESS: 0
SINUS PAIN: 0
EYE DISCHARGE: 0
DIARRHEA: 0

## 2020-09-21 ASSESSMENT — PAIN DESCRIPTION - DESCRIPTORS: DESCRIPTORS: HEADACHE

## 2020-09-21 ASSESSMENT — PAIN - FUNCTIONAL ASSESSMENT: PAIN_FUNCTIONAL_ASSESSMENT: ACTIVITIES ARE NOT PREVENTED

## 2020-09-21 ASSESSMENT — PAIN SCALES - GENERAL: PAINLEVEL_OUTOF10: 3

## 2020-09-21 ASSESSMENT — PAIN DESCRIPTION - FREQUENCY: FREQUENCY: INTERMITTENT

## 2020-09-21 ASSESSMENT — PAIN DESCRIPTION - LOCATION: LOCATION: HEAD

## 2020-09-21 ASSESSMENT — PAIN DESCRIPTION - PAIN TYPE: TYPE: ACUTE PAIN

## 2020-09-21 NOTE — ED PROVIDER NOTES
[address]        Pt Name: Ivanna Izquierdo  MRN: [id number]  [place] 1955  Date of evaluation: 9/21/2020  Provider: Zuleika Nolan PA-C  PCP: Anel Pittman MD  ED Attending: No att. providers found      This patient was seen and evaluated by the attending physician No att. providers found. I have not independently evaluated this patient. CHIEF COMPLAINT       Chief Complaint   Patient presents with    Dizziness     dizziness x 3 days, fell thursday, denies hitting her head       HISTORY OF PRESENT ILLNESS   (Location/Symptom, Timing/Onset, Context/Setting, Quality, Duration, Modifying Factors, Severity)  Note limiting factors. Ivanna Izquierdo is a 60 y.o. female with a h/o COPD, JAYY, DM, osteoarthritis, HTN, obesity, for evaluation via EMS after patient indicates that she woke up this morning at 7:10 AM feeling \"disoriented\". Patient describes this not as vertigo but as a near syncope sensation. She advised she also feels very dry. She has a sore throat. 3/10 throbbing, along with a headache, gradual onset, bilateral, 3/10 aching. No radiation. Intermittent since initial onset. She denies any chest pain or coughing. Nursing Notes were all reviewed and agreed with or any disagreements were addressed  in the HPI. REVIEW OF SYSTEMS  (2-9 systems for level 4, 10 or more for level 5)     Review of Systems   Constitutional: Negative for chills and fever. HENT: Positive for sore throat. Negative for congestion, rhinorrhea, sinus pressure and sinus pain. Eyes: Negative for discharge, redness and visual disturbance. Respiratory: Negative for cough, chest tightness and shortness of breath. Cardiovascular: Negative for chest pain and palpitations. Gastrointestinal: Negative for abdominal pain, constipation, diarrhea, nausea and vomiting. Genitourinary: Negative for difficulty urinating, dysuria and frequency. Musculoskeletal: Positive for arthralgias and myalgias. Skin: Negative. Neurological: Positive for weakness and light-headedness. Negative for dizziness, numbness and headaches. Psychiatric/Behavioral: Negative. All other systems reviewed and are negative. Positivesand Pertinent negatives as per HPI. Except as noted above in the ROS, all other systems were reviewed and negative.        PAST MEDICAL HISTORY     Past Medical History:   Diagnosis Date    [facility] [address]     Diabetes mellitus ([facility] [facility]     Hypertension     Primary osteoarthritis of right knee 6/21/2018    Sleep apnea          SURGICAL HISTORY       Past Surgical History:   Procedure Laterality Date    CHOLECYSTECTOMY      COLPOSCOPY      KNEE SURGERY           CURRENT MEDICATIONS       Discharge Medication List as of 9/22/2020  3:06 PM      CONTINUE these medications which have NOT CHANGED    Details   umeclidinium-vilanterol (ANORO ELLIPTA) 62.5-25 MCG/INH AEPB inhaler Inhale 1 puff into the lungs daily, Disp-1 each,R-5Normal      albuterol sulfate HFA (PROAIR HFA) 108 (90 Base) MCG/ACT inhaler Inhale 2 puffs into the lungs every 6 hours as needed for Wheezing or Shortness of Breath, Disp-1 Inhaler, R-5Normal      Diclofenac (ZORVOLEX) 18 MG CAPS Take 1 tablet by mouth 2 times daily as neededHistorical Med      atorvastatin (LIPITOR) 10 MG tablet Take 10 mg by mouth dailyHistorical Med      escitalopram (LEXAPRO) 20 MG tablet TK 1 T PO QD, R-3Historical Med      metFORMIN (GLUCOPHAGE-XR) 500 MG extended release tablet TK 2 TS PO QD, R-0Historical Med      SITagliptin (JANUVIA) 50 MG tablet Take 100 mg by mouth daily Historical Med               ALLERGIES     Penicillins    FAMILY HISTORY       Family History   Problem Relation Age of Onset    Hypertension Mother     Heart Failure Mother          SOCIAL HISTORY       Social History     Socioeconomic History    Marital status:      Spouse name: None    Number of children: None    Years of education: None    Highest education level: None   Occupational History    None   Social Needs    Financial resource strain: None    Food insecurity     Worry: None     Inability: None    Transportation needs     Medical: None     Non-medical: None   Tobacco Use    Smoking status: Former Smoker     Packs/day: 1.00     Years: 30.00     Pack years: 30.00     Types: Cigarettes     Last attempt to quit: 2015     Years since quittin.6    Smokeless tobacco: Never Used   Substance and Sexual Activity    Alcohol use: No     Alcohol/week: 0.0 standard drinks    Drug use: No    Sexual activity: Never   Lifestyle    Physical activity     Days per week: None     Minutes per session: None    Stress: None   Relationships    Social connections     Talks on phone: None     Gets together: None     Attends Ca[employer] service: None     Active member of club or organization: None     Attends meetings of clubs or organizations: None     Relationship status: None    Intimate partner violence     Fear of current or ex partner: None     Emotionally abused: None     Physically abused: None     Forced sexual activity: None   Other Topics Concern    None   Social History Narrative    None       SCREENINGS     NIH Score       [place] Coma Scale  Eye Opening: Spontaneous  Best Verbal Response: Oriented  Best Motor Response: Obeys commands  [place] Coma Scale Score: 15    [facility]     Heart Score         PHYSICAL EXAM    (up to 7 for level 4, 8 ormore for level 5)     ED Triage Vitals [20 1114]   BP Temp Temp Source Pulse Resp SpO2 Height Weight   120/65 97.9 °F (36.6 °C) Oral 109 19 90 % 5' 6\" (1.676 m) 220 lb (99.8 kg)       Physical Exam  Vitals signs and nursing note reviewed. Constitutional:       Appearance: She is well-developed. She is obese. She is ill-appearing. She is not diaphoretic. HENT:      Head: Normocephalic.       Nose: Nose normal.      Mouth/Throat:      Pharynx: No oropharyngeal exudate. Eyes:      General:         Right eye: No discharge. Left eye: No discharge. Conjunctiva/sclera: Conjunctivae normal.      Pupils: Pupils are equal, round, and reactive to light. Neck:      Musculoskeletal: Normal range of motion. Cardiovascular:      Rate and Rhythm: Regular rhythm. Tachycardia present. Heart sounds: Normal heart sounds. No murmur. No friction rub. No gallop. Pulmonary:      Effort: No respiratory distress. Breath sounds: No wheezing. Comments: Poor air movement thorughout  Abdominal:      General: Bowel sounds are normal. There is no distension. Palpations: Abdomen is soft. Tenderness: There is no abdominal tenderness. Musculoskeletal: Normal range of motion. Skin:     General: Skin is warm and dry. Neurological:      General: No focal deficit present. Mental Status: She is alert and oriented to person, place, and time. Mental status is at baseline. Cranial Nerves: No cranial nerve deficit. Sensory: No sensory deficit. Motor: No weakness. Coordination: Coordination normal.      Gait: Gait normal.      Deep Tendon Reflexes: Reflexes normal.      Comments: Normal neuro exam, no abnormal nystagmus or saccades.   Normal heel-to-shin and finger-to-nose   Psychiatric:         Mood and Affect: Mood normal.         Behavior: Behavior normal.         DIAGNOSTIC RESULTS   LABS:    Labs Reviewed   CBC WITH AUTO DIFFERENTIAL - Abnormal; Notable for the following components:       Result Value    RBC 3.91 (*)     All other components within normal limits    Narrative:     Performed at:  [place] [address],  [place], [facility]   Phone [phone]   COMPREHENSIVE METABOLIC PANEL W/ REFLEX TO MG FOR LOW K - Abnormal; Notable for the following components:    Glucose 286 (*)     BUN 22 (*)     CREATININE 1.4 (*)     GFR ORDERED BY: Maikol Walter  SOURCE: Urine Clean Catch                  COLLECTED:  09/21/20 21:30  ANTIBIOTICS AT MARY.:                      RECEIVED :  09/21/20 22:45  Performed at:  [facility]  [address] [facility] [address]   Phone [phone]   TROPONIN    Narrative:     Performed at:  [facility],  [place], [facility]   Phone [phone]   BRAIN NATRIURETIC PEPTIDE    Narrative:     Performed at:  [facility],  [place], [facility]   Phone [phone]    Narrative:     Performed at:  [facility]  [address] [facility] [address]   Phone [phone]   PROTIME-INR    Narrative:     Performed at:  [facility],  [place], [facility]   Phone [phone]   APTT    Narrative:     Performed at:  [facility],  [place], [facility]   Phone [phone]   FIBRINOGEN    Narrative:     Performed at:  [facility],  [place], [facility]   Phone [phone]   LACTATE DEHYDROGENASE    Narrative:     Performed at:  [facility],  [place], [facility]   Phone [phone]   FERRITIN    Narrative:     Performed at:  [facility]  [address] [address]   Phone [phone]   HEMOGLOBIN A1C    Narrative:     Performed at:  [facility]  [address]   Phone [phone]   PROTIME-INR    Narrative:     Performed at:  [facility],  [place], [facility]   Phone [phone]   APTT    Narrative:     Performed at:  [facility]) - [facility],  [place], [facility]   Phone [phone]   FIBRINOGEN    Narrative:     Performed at:  [facility]) - [facility],  [place], [facility]   Phone [phone]   POCT GLUCOSE   POCT GLUCOSE   POCT GLUCOSE   POCT GLUCOSE   TYPE AND SCREEN    Narrative:     Performed at:  [facility],  [place], [facility]   Phone [phone]       All other labs were within normal range or notreturned as of this dictation. EKG: All EKG's are interpreted by the Emergency Department Physician who either signs or Co-signs this chart in the absence of a cardiologist.  Please see their note for interpretation of EKG. RADIOLOGY:         Interpretation per the Radiologist below, if available at the time of this note:    CT Head WO Contrast   Final Result   No acute intracranial abnormality. XR CHEST PORTABLE   Final Result   No acute process. No significant interval change compared to the prior study           No results found. CONSULTS:  Hospitalist, agrees to accept the patient in stable condition. Discussed Plan for continued care. EMERGENCY DEPARTMENT COURSE and DIFFERENTIAL DIAGNOSIS/MDM:   Vitals:    Vitals:    09/22/20 0219 09/22/20 0600 09/22/20 0755 09/22/20 1547   BP: (!) 141/77  116/61 123/72   Pulse: 95  101 94   Resp: 16  16 16   Temp: 97.9 °F (36.6 °C)  97.5 °F (36.4 °C) 97.9 °F (36.6 °C)   TempSrc: Oral  Oral Oral   SpO2: 93%  95% 95%   Weight:  259 lb 11.2 oz (117.8 kg)     Height:  5' 6\" (1.676 m)         Patient was given the following medications:  Medications   0.9 % sodium chloride bolus (0 mLs Intravenous Stopped 9/21/20 1427)   ondansetron (ZOFRAN) injection 4 mg (4 mg Intravenous Given 9/21/20 1222)         Afebrile, stable, patient presents to the ED for evaluation.    Seen in conjunction with attending ED provider who agrees with assessment and plan.  Labs reveal acute on chronic kidney disease. Unable to perform CTA due to patient's kidney function. Patients SPO2 is 90% on room air at rest. Will ambulate with pulse ox, pt is tachycardic, provided with IV fluids and zofran which helps her sxms on re-evaluation. Concern for covid, hypoxic on room air, will admit to hospitalist.     All questions are answered. They were seen and evaluated by the attendingphysician, No att. providers found who agreed with the assessment and plan. The patient and / or the family were informed of the results of any tests, a time was given to answer questions, a plan was proposed and they agreed Frankie Tolentino. FINAL IMPRESSION      1. DIANDRA (acute kidney injury) ([facility] [address]    2.  Near syncope          DISPOSITION/PLAN   DISPOSITION Admitted 09/21/2020 02:29:25 PM      PATIENT REFERRED TO:  Coral Rivera MD  [address] [place] [address]  [phone]            DISCHARGE MEDICATIONS:  Discharge Medication List as of 9/22/2020  3:06 PM      START taking these medications    Details   ciprofloxacin (CIPRO) 500 MG tablet Take 1 tablet by mouth 2 times daily for 7 days, Disp-14 tablet,R-0Normal             DISCONTINUED MEDICATIONS:  Discharge Medication List as of 9/22/2020  3:06 PM      STOP taking these medications       lisinopril (PRINIVIL;ZESTRIL) 20 MG tablet Comments:   Reason for Stopping:                      (Please note that portions of this note were completed with a voice recognition program.  Efforts were made to edit the dictations but occasionally words are mis-transcribed.)    Sanket White PA-C (electronically signed)        Sanket White PA-C  09/24/20 1211

## 2020-09-21 NOTE — PROGRESS NOTES
RESPIRATORY THERAPY ASSESSMENT    Name:  [address]Kurt  [facility] Record Number:  [id number]  Age: 60 y.o. Gender: female  : 1955  Today's Date:  2020  Room:  0204/0204-01    Assessment     Is the patient being admitted for a COPD or Asthma exacerbation? No   (If yes the patient will be seen every 4 hours for the first 24 hours and then reassessed)    Patient Admission Diagnosis      Allergies  Allergies   Allergen Reactions    Penicillins      Throat closes, hives       Minimum Predicted Vital Capacity:               Actual Vital Capacity:                    Pulmonary History:COPD  Home Oxygen Therapy:  2 liters/min via nasal cannula at night  Home Respiratory Therapy: patient unsure what BID med she takes   Current Respiratory Therapy:  Albuterol PRN          Respiratory Severity Index(RSI)   Patients with orders for inhalation medications, oxygen, or any therapeutic treatment modality will be placed on Respiratory Protocol. They will be assessed with the first treatment and at least every 72 hours thereafter. The following severity scale will be used to determine frequency of treatment intervention.     Smoking History: Pulmonary Disease or Smoking History, Greater than 15 pack year = 2    Social History  Social History     Tobacco Use    Smoking status: Former Smoker     Packs/day: 1.00     Years: 30.00     Pack years: 30.00     Types: Cigarettes     Last attempt to quit: 2015     Years since quittin.6    Smokeless tobacco: Never Used   Substance Use Topics    Alcohol use: No     Alcohol/week: 0.0 standard drinks    Drug use: No       Recent Surgical History: None = 0  Past Surgical History  Past Surgical History:   Procedure Laterality Date    CHOLECYSTECTOMY      COLPOSCOPY      KNEE SURGERY         Level of Consciousness: Alert, Oriented, and Cooperative = 0    Level of Activity: Walking unassisted = 0    Respiratory Pattern: Regular Pattern; RR 8-20 = 0    Breath Sounds: Diminshed bilaterally and/or crackles = 2    Sputum   ,  ,    Cough: Strong, spontaneous, non-productive = 0    Vital Signs   /68   Pulse 98   Temp 96.5 °F (35.8 °C) (Oral)   Resp 18   Ht 5' 6\" (1.676 m)   Wt 220 lb (99.8 kg)   SpO2 94%   BMI 35.51 kg/m²   SPO2 (COPD values may differ): 90-91% on room air or greater than 92% on FiO2 24- 28% = 1    Peak Flow (asthma only): not applicable = 0    RSI: 5-6 = Q4hr PRN (every four hours as needed) for dyspnea        Plan       Goals: medication delivery    Patient/caregiver was educated on the proper method of use for Respiratory Care Devices:  Yes      Level of patient/caregiver understanding able to:   ? Verbalize understanding   ? Demonstrate understanding       ? Teach back        ? Needs reinforcement       ? No available caregiver               ? Other:     Response to education:  Excellent     Is patient being placed on Home Treatment Regimen? No     Does the patient have everything they need prior to discharge? Yes     Comments: patient assessed. Chart reviewed    Plan of Care: remain on current regimen. Electronically signed by Zoe Flores RCP on 9/21/2020 at 5:55 PM    Respiratory Protocol Guidelines     1. Assessment and treatment by Respiratory Therapy will be initiated for medication and therapeutic interventions upon initiation of aerosolized medication. 2. Physician will be contacted for respiratory rate (RR) greater than 35 breaths per minute. Therapy will be held for heart rate (HR) greater than 140 beats per minute, pending direction from physician. 3. Bronchodilators will be administered via Metered Dose Inhaler (MDI) with spacer when the following criteria are met:  a. Alert and cooperative     b. HR < 140 bpm  c. RR < 30 bpm                d. Can demonstrate a 2-3 second inspiratory hold  4. Bronchodilators will be administered via Hand Held Nebulizer [facility]) to patients when ANY of the following criteria are met  a.  Incognizant or uncooperative          b. Patients treated with HHN at Home        c. Unable to demonstrate proper use of MDI with spacer     d. RR > 30 bpm   5. Bronchodilators will be delivered via Metered Dose Inhaler (MDI), HHN, Aerogen to intubated patients on mechanical ventilation. 6. Inhalation medication orders will be delivered and/or substituted as outlined below. Aerosolized Medications Ordering and Administration Guidelines:    1. All Medications will be ordered by a physician, and their frequency and/or modality will be adjusted as defined by the patients Respiratory Severity Index (RSI) score. 2. If the patient does not have documented COPD, consider discontinuing anticholinergics when RSI is less than 9.  3. If the bronchospasm worsens (increased RSI), then the bronchodilator frequency can be increased to a maximum of every 4 hours. If greater than every 4 hours is required, the physician will be contacted. 4. If the bronchospasm improves, the frequency of the bronchodilator can be decreased, based on the patient's RSI, but not less than home treatment regimen frequency. 5. Bronchodilator(s) will be discontinued if patient has a RSI less than 9 and has received no scheduled or as needed treatment for 72  Hrs. Patients Ordered on a Mucolytic Agent:    1. Must always be administered with a bronchodilator. 2. Discontinue if patient experiences worsened bronchospasm, or secretions have lessened to the point that the patient is able to clear them with a cough. Anti-inflammatory and Combination Medications:    1. If the patient lacks prior history of lung disease, is not using inhaled anti-inflammatory medication at home, and lacks wheezing by examination or by history for at least 24 hours, contact physician for possible discontinuation.

## 2020-09-21 NOTE — PLAN OF CARE
Problem: Falls - Risk of:  Goal: Will remain free from falls  Description: Will remain free from falls  9/21/2020 1945 by Valentin Tran RN  Outcome: Ongoing  9/21/2020 1852 by Gloria Abraham RN  Outcome: Ongoing  Goal: Absence of physical injury  Description: Absence of physical injury  9/21/2020 1945 by Valentin Tran RN  Outcome: Ongoing  9/21/2020 1852 by Gloria Abraham RN  Outcome: Ongoing     Problem: Infection:  Goal: Will remain free from infection  Description: Will remain free from infection  9/21/2020 1945 by Valentin Tran RN  Outcome: Ongoing  9/21/2020 1852 by Gloria Abraham RN  Outcome: Ongoing     Problem: Safety:  Goal: Free from accidental physical injury  Description: Free from accidental physical injury  9/21/2020 1945 by Valentin Tran RN  Outcome: Ongoing  9/21/2020 1852 by Gloria Abraham RN  Outcome: Ongoing  Goal: Free from intentional harm  Description: Free from intentional harm  9/21/2020 1945 by Valentin Tran RN  Outcome: Ongoing  9/21/2020 1852 by Gloria Abraham RN  Outcome: Ongoing     Problem: Daily Care:  Goal: Daily care needs are met  Description: Daily care needs are met  9/21/2020 1945 by Valentin Tran RN  Outcome: Ongoing  9/21/2020 1852 by Gloria Abraham RN  Outcome: Ongoing     Problem: Pain:  Goal: Patient's pain/discomfort is manageable  Description: Patient's pain/discomfort is manageable  9/21/2020 1945 by Valentin Tran RN  Outcome: Ongoing  9/21/2020 1852 by Gloria Abraham RN  Outcome: Ongoing     Problem: Skin Integrity:  Goal: Skin integrity will stabilize  Description: Skin integrity will stabilize  9/21/2020 1945 by Valentin Tran RN  Outcome: Ongoing  9/21/2020 1852 by Gloria Abraham RN  Outcome: Ongoing     Problem: Discharge Planning:  Goal: Patients continuum of care needs are met  Description: Patients continuum of care needs are met  9/21/2020 1945 by Valentin Tran RN  Outcome: Ongoing  9/21/2020 1852 by Gloria Abraham RN  Outcome: Ongoing

## 2020-09-21 NOTE — ED PROVIDER NOTES
clinically significant/life threatening deterioration in this patient's condition which required my urgent intervention. Total critical care time was  minutes. This excludes any time for separately reportable procedures. Ivan Garner DO  Emergency Physician        Comment: Please note this report has been produced using speech recognition software and may contain errors related to that system including errors in grammar, punctuation, and spelling, as well as words and phrases that may be inappropriate. If there are any questions or concerns please feel free to contact the dictating provider for clarification.           Ivan Garner DO  09/21/20 1538

## 2020-09-21 NOTE — ED NOTES
Writer ambulated patient in room per [place], [place] order. Patient O2 sat 90% on RA prior to ambulation. Patient was able to maintain her O2 sat at 90% while slowly ambulating but was tachycardic around 118 BPM. Patient O2 sat dropped as low as 85% after getting back into bed and was staying 87% for several minutes. Patient seemed very SOB and was breathing faster and deeper. Patient was able to recover O2 sat back to 90% on her own.        Fili Wills RN  09/21/20 9978

## 2020-09-21 NOTE — H&P
Hospital Medicine History & Physical      PCP: Krishan Braun MD    Date of Admission: 9/21/2020    Date of Service: Pt seen/examined on 9/21/2020    Chief Complaint:    Chief Complaint   Patient presents with    Dizziness     dizziness x 3 days, fell thursday, denies hitting her head         History Of Present Illness: The patient is a 60 y.o. female with a PMH of COPD, Type II DM, HTN who presented to [facility] ED with complaint of fatigue, sorethroat , dizziness since this am, reports feeling unwell with vague symptoms of which sorethroat is her main important issue. Reports insidious onset of symptoms this am, and rapidly worse with trouble swallowing speaking . No drooling of saliva. No trouble breathing with rest but has exertional dyspnea . No exposure to covid . Workup showed mild renal failure, dehydration and admitted for IV hydration and covid testing        Past Medical History:        Diagnosis Date    [facility]     Diabetes mellitus [facility]     Hypertension     Primary osteoarthritis of right knee 6/21/2018    Sleep apnea        Past Surgical History:        Procedure Laterality Date    CHOLECYSTECTOMY      COLPOSCOPY      KNEE SURGERY         Medications Prior to Admission:    Prior to Admission medications    Medication Sig Start Date End Date Taking?  Authorizing Provider   umeclidinium-vilanterol (ANORO ELLIPTA) 62.5-25 MCG/INH AEPB inhaler Inhale 1 puff into the lungs daily 7/21/20  Yes Rama Barnes MD   albuterol sulfate HFA (PROAIR HFA) 108 (90 Base) MCG/ACT inhaler Inhale 2 puffs into the lungs every 6 hours as needed for Wheezing or Shortness of Breath 5/12/20  Yes Rama Barnes MD   Diclofenac (ZORVOLEX) 18 MG CAPS Take 1 tablet by mouth 2 times daily as needed   Yes Historical Provider, MD   atorvastatin (LIPITOR) 10 MG tablet Take 10 mg by mouth daily   Yes Historical Provider, MD   lisinopril (PRINIVIL;ZESTRIL) 20 MG tablet TK 1 T PO QD > 50% with respiration consistent   with normal RA pressure (3 mmHg). No obvious masses, thrombi, or vegetations are noted. ASSESSMENT/PLAN:      COVID R/O  -  - CXR: non acute  - Admit to Med Surg, droplet + precautions  - supp O2, wean as tolerated  - COVID test pending    Copd with Chronic  Hypoxic Respiratory Failure  - resume home oxygen of 2 L   - cont PRN Albuterol    Acute pharyngitis -check strep swab   covid pending    DIANDRA  - Cr on admission: 1.4  - IVF, hold Metformin, Lisinopril  - repeat BMP tomorrow    Type II DM  - uncontrolled  - Hold Metformin, low dose SSI  - POC Glucose, carb control diet     Prolonged QTc  -   - avoid QT prolonging agents as able, repeat EKG tomorrow  - Hold Lexapro for now    HLD  - cont Lipitor    Obesity  - Body mass index is 35.51 kg/m². - Counseled on weight loss.      DVT Prophylaxis: Lovenox  Diet: DIET CARB CONTROL;  Code Status: Full Code    Edwar Manuel MD 9/21/2020 7:10 PM

## 2020-09-22 VITALS
TEMPERATURE: 97.9 F | OXYGEN SATURATION: 95 % | DIASTOLIC BLOOD PRESSURE: 72 MMHG | HEIGHT: 66 IN | HEART RATE: 94 BPM | BODY MASS INDEX: 41.74 KG/M2 | WEIGHT: 259.7 LBS | SYSTOLIC BLOOD PRESSURE: 123 MMHG | RESPIRATION RATE: 16 BRPM

## 2020-09-22 LAB
ABO/RH: NORMAL
ANION GAP SERPL CALCULATED.3IONS-SCNC: 8 MMOL/L (ref 3–16)
ANTIBODY SCREEN: NORMAL
APTT: 31.5 SEC (ref 24.2–36.2)
BASOPHILS ABSOLUTE: 0 K/UL (ref 0–0.2)
BASOPHILS RELATIVE PERCENT: 0.4 %
BUN BLDV-MCNC: 18 MG/DL (ref 7–20)
CALCIUM SERPL-MCNC: 9.5 MG/DL (ref 8.3–10.6)
CHLORIDE BLD-SCNC: 101 MMOL/L (ref 99–110)
CO2: 27 MMOL/L (ref 21–32)
CREAT SERPL-MCNC: 1.1 MG/DL (ref 0.6–1.2)
EKG ATRIAL RATE: 98 BPM
EKG DIAGNOSIS: NORMAL
EKG P AXIS: 78 DEGREES
EKG P-R INTERVAL: 156 MS
EKG Q-T INTERVAL: 372 MS
EKG QRS DURATION: 74 MS
EKG QTC CALCULATION (BAZETT): 474 MS
EKG R AXIS: -6 DEGREES
EKG T AXIS: 61 DEGREES
EKG VENTRICULAR RATE: 98 BPM
EOSINOPHILS ABSOLUTE: 0.1 K/UL (ref 0–0.6)
EOSINOPHILS RELATIVE PERCENT: 2 %
ESTIMATED AVERAGE GLUCOSE: 246 MG/DL
FERRITIN: 120.9 NG/ML (ref 15–150)
FIBRINOGEN: 333 MG/DL (ref 200–397)
GFR AFRICAN AMERICAN: >60
GFR NON-AFRICAN AMERICAN: 50
GLUCOSE BLD-MCNC: 180 MG/DL (ref 70–99)
GLUCOSE BLD-MCNC: 215 MG/DL (ref 70–99)
GLUCOSE BLD-MCNC: 218 MG/DL (ref 70–99)
GLUCOSE BLD-MCNC: 224 MG/DL (ref 70–99)
HBA1C MFR BLD: 10.2 %
HCT VFR BLD CALC: 33 % (ref 36–48)
HEMOGLOBIN: 11 G/DL (ref 12–16)
INR BLD: 1.06 (ref 0.86–1.14)
LYMPHOCYTES ABSOLUTE: 2 K/UL (ref 1–5.1)
LYMPHOCYTES RELATIVE PERCENT: 33.7 %
MCH RBC QN AUTO: 31.8 PG (ref 26–34)
MCHC RBC AUTO-ENTMCNC: 33.4 G/DL (ref 31–36)
MCV RBC AUTO: 95.4 FL (ref 80–100)
MONOCYTES ABSOLUTE: 0.4 K/UL (ref 0–1.3)
MONOCYTES RELATIVE PERCENT: 6.4 %
NEUTROPHILS ABSOLUTE: 3.4 K/UL (ref 1.7–7.7)
NEUTROPHILS RELATIVE PERCENT: 57.5 %
PDW BLD-RTO: 14.6 % (ref 12.4–15.4)
PERFORMED ON: ABNORMAL
PLATELET # BLD: 220 K/UL (ref 135–450)
PMV BLD AUTO: 8.6 FL (ref 5–10.5)
POTASSIUM REFLEX MAGNESIUM: 4.4 MMOL/L (ref 3.5–5.1)
PROTHROMBIN TIME: 12.3 SEC (ref 10–13.2)
RBC # BLD: 3.46 M/UL (ref 4–5.2)
SODIUM BLD-SCNC: 136 MMOL/L (ref 136–145)
URINE CULTURE, ROUTINE: NORMAL
WBC # BLD: 5.9 K/UL (ref 4–11)

## 2020-09-22 PROCEDURE — 85384 FIBRINOGEN ACTIVITY: CPT

## 2020-09-22 PROCEDURE — 85025 COMPLETE CBC W/AUTO DIFF WBC: CPT

## 2020-09-22 PROCEDURE — 93005 ELECTROCARDIOGRAM TRACING: CPT | Performed by: PHYSICIAN ASSISTANT

## 2020-09-22 PROCEDURE — G0378 HOSPITAL OBSERVATION PER HR: HCPCS

## 2020-09-22 PROCEDURE — 6370000000 HC RX 637 (ALT 250 FOR IP): Performed by: INTERNAL MEDICINE

## 2020-09-22 PROCEDURE — 36415 COLL VENOUS BLD VENIPUNCTURE: CPT

## 2020-09-22 PROCEDURE — 99238 HOSP IP/OBS DSCHRG MGMT 30/<: CPT | Performed by: INTERNAL MEDICINE

## 2020-09-22 PROCEDURE — 6370000000 HC RX 637 (ALT 250 FOR IP): Performed by: PHYSICIAN ASSISTANT

## 2020-09-22 PROCEDURE — 96372 THER/PROPH/DIAG INJ SC/IM: CPT

## 2020-09-22 PROCEDURE — 85610 PROTHROMBIN TIME: CPT

## 2020-09-22 PROCEDURE — 6360000002 HC RX W HCPCS: Performed by: PHYSICIAN ASSISTANT

## 2020-09-22 PROCEDURE — 93010 ELECTROCARDIOGRAM REPORT: CPT | Performed by: INTERNAL MEDICINE

## 2020-09-22 PROCEDURE — 80048 BASIC METABOLIC PNL TOTAL CA: CPT

## 2020-09-22 PROCEDURE — 85730 THROMBOPLASTIN TIME PARTIAL: CPT

## 2020-09-22 RX ORDER — CIPROFLOXACIN 500 MG/1
500 TABLET, FILM COATED ORAL 2 TIMES DAILY
Qty: 14 TABLET | Refills: 0 | Status: SHIPPED | OUTPATIENT
Start: 2020-09-22 | End: 2020-09-29

## 2020-09-22 RX ADMIN — ATORVASTATIN CALCIUM 10 MG: 10 TABLET, FILM COATED ORAL at 09:18

## 2020-09-22 RX ADMIN — INSULIN LISPRO 2 UNITS: 100 INJECTION, SOLUTION INTRAVENOUS; SUBCUTANEOUS at 09:18

## 2020-09-22 RX ADMIN — INSULIN LISPRO 1 UNITS: 100 INJECTION, SOLUTION INTRAVENOUS; SUBCUTANEOUS at 12:58

## 2020-09-22 RX ADMIN — ENOXAPARIN SODIUM 40 MG: 40 INJECTION SUBCUTANEOUS at 09:18

## 2020-09-22 RX ADMIN — ACETAMINOPHEN 650 MG: 325 TABLET ORAL at 12:54

## 2020-09-22 RX ADMIN — DOXYCYCLINE HYCLATE 100 MG: 100 TABLET, COATED ORAL at 09:18

## 2020-09-22 ASSESSMENT — PAIN SCALES - GENERAL
PAINLEVEL_OUTOF10: 3
PAINLEVEL_OUTOF10: 0

## 2020-09-22 ASSESSMENT — PAIN DESCRIPTION - DESCRIPTORS: DESCRIPTORS: ACHING;SORE

## 2020-09-22 ASSESSMENT — PAIN DESCRIPTION - ORIENTATION: ORIENTATION: MID

## 2020-09-22 ASSESSMENT — PAIN DESCRIPTION - LOCATION: LOCATION: THROAT

## 2020-09-22 ASSESSMENT — PAIN DESCRIPTION - PROGRESSION: CLINICAL_PROGRESSION: NOT CHANGED

## 2020-09-22 ASSESSMENT — PAIN DESCRIPTION - DIRECTION: RADIATING_TOWARDS: DENIES

## 2020-09-22 ASSESSMENT — PAIN DESCRIPTION - PAIN TYPE: TYPE: ACUTE PAIN

## 2020-09-22 ASSESSMENT — PAIN DESCRIPTION - FREQUENCY: FREQUENCY: CONTINUOUS

## 2020-09-22 ASSESSMENT — PAIN DESCRIPTION - ONSET: ONSET: ON-GOING

## 2020-09-22 NOTE — DISCHARGE SUMMARY
Name:  Dre Morse  Room:  0204/0204-01  MRN:    [id number]    Discharge Summary      This discharge summary is in conjunction with a complete physical exam done on the day of discharge. Discharging Physician: Henrry Shen MD      Admit: 9/21/2020  Discharge:  9/22/2020    HPI taken from admission H&P:      The patient is a 60 y.o. female with a PMH of COPD, Type II DM, HTN who presented to [facility] ED with complaint of fatigue, sorethroat , dizziness since this am, reports feeling unwell with vague symptoms of which sorethroat is her main important issue. Reports insidious onset of symptoms this am, and rapidly worse with trouble swallowing speaking . No drooling of saliva. No trouble breathing with rest but has exertional dyspnea . No exposure to covid . Workup showed mild renal failure, dehydration and admitted for IV hydration and covid testing    Diagnoses this Admission and [facility]: non acute  - Admitted to Med Surg, droplet + precautions  - supp O2, wean as tolerated  - COVID test: negative     Copd with Chronic  Hypoxic Respiratory Failure  - resume home oxygen of 2 L   - cont PRN Albuterol     Acute pharyngitis   - checked strep swab: negative  - covid: neg   - exam with submandibular LN +, no tonsillar swelling or exudates or any pharyngeal erythema. Treat with cipro      DIANDRA - Resolved  - Cr on admission: 1.4  - IVF, held Metformin, Lisinopril  - repeat BMP w/ Cr of 1.1  - stopped Lisinopril and resumed Metformin at d/c     Type II DM  - uncontrolled  - Held Metformin, low dose SSI  - POC Glucose, carb control diet   - resumed home regimen at d/c     Prolonged QTc  -   - avoided QT prolonging agents as able, repeat EKG as below  - Held Lexapro for now -> improved Qtc on repeat 474     HLD  - cont'd Lipitor     Obesity  - Body mass index is 35.51 kg/m². - Counseled on weight loss.      Procedures (Please Review Full Report for Details)  N/A    Consults N/A    Physical Exam at Discharge:    /61   Pulse 101   Temp 97.5 °F (36.4 °C) (Oral)   Resp 16   Ht 5' 6\" (1.676 m)   Wt 259 lb 11.2 oz (117.8 kg)   SpO2 95%   BMI 41.92 kg/m²     Gen: middle aged obese female,  No distress. Alert. Eyes: PERRL. No sclera icterus. No conjunctival injection. ENT: No discharge. Pharynx unable to be seen . Large tongue, no tongue swelling , tonsils normal  Uvula normal. Mild submandibular LN +   No audible upper airway stridor. Neck: No JVD. No Carotid Bruit. Trachea midline. Resp: No accessory muscle use. No crackles. No wheezes. No rhonchi. CV: Regular rate. Regular rhythm. No murmur. No rub. No edema. Capillary Refill: Brisk,< 3 seconds   Peripheral Pulses: +2 palpable, equal bilaterally   GI: Non-tender. Non-distended. No masses. No organomegaly. Normal bowel sounds. No hernia. Skin: Warm and dry. No nodule on exposed extremities. No rash on exposed extremities. M/S: No cyanosis. No joint deformity. No clubbing. Neuro: Awake. Grossly nonfocal    Psych: Oriented x 3. No anxiety or agitation.      CBC:   Recent Labs     09/21/20  1117 09/22/20  0714   WBC 6.3 5.9   HGB 12.4 11.0*   HCT 37.0 33.0*   MCV 94.5 95.4    220     BMP:   Recent Labs     09/21/20  1117 09/22/20  0714    136   K 3.8 4.4   CL 99 101   CO2 25 27   BUN 22* 18   CREATININE 1.4* 1.1     LIVER PROFILE:   Recent Labs     09/21/20  1117   AST 31   ALT 28   BILITOT 0.3   ALKPHOS 90     PT/INR:   Recent Labs     09/21/20  1622 09/22/20  0714   PROTIME 11.7 12.3   INR 1.01 1.06     APTT:   Recent Labs     09/21/20  1622 09/22/20  0714   APTT 32.2 31.5     UA:  Recent Labs     09/21/20  2130   COLORU Yellow   PHUR 5.5   WBCUA 10-20*   RBCUA 3-4   BACTERIA 2+*   CLARITYU Clear   SPECGRAV 1.010   LEUKOCYTESUR SMALL*   UROBILINOGEN 0.2   BILIRUBINUR Negative   BLOODU Negative   GLUCOSEU 500*   AMORPHOUS 2+     CULTURES    Rapid strep neg    RADIOLOGY    CT Head WO Contrast 9/21/2020   Final Result   No acute intracranial abnormality. XR CHEST PORTABLE 9/21/2020   Final Result   No acute process. No significant interval change compared to the prior study           Discharge Medications     Medication List      START taking these medications    ciprofloxacin 500 MG tablet  Commonly known as:  Cipro  Take 1 tablet by mouth 2 times daily for 7 days        CONTINUE taking these medications    albuterol sulfate  (90 Base) MCG/ACT inhaler  Commonly known as:  ProAir HFA  Inhale 2 puffs into the lungs every 6 hours as needed for Wheezing or Shortness of Breath     Anoro Ellipta 62.5-25 MCG/INH Aepb inhaler  Generic drug:  umeclidinium-vilanterol  Inhale 1 puff into the lungs daily     atorvastatin 10 MG tablet  Commonly known as:  LIPITOR     escitalopram 20 MG tablet  Commonly known as:  LEXAPRO     metFORMIN 500 MG extended release tablet  Commonly known as:  GLUCOPHAGE-XR     SITagliptin 50 MG tablet  Commonly known as:  JANUVIA     Zorvolex 18 MG Caps  Generic drug:  Diclofenac        STOP taking these medications    lisinopril 20 MG tablet  Commonly known as:  PRINIVIL;ZESTRIL           Where to Get Your Medications      These medications were sent to [address]    Phone:  [phone]   · ciprofloxacin 500 MG tablet           Discharged in stable condition to home. Follow Up: Follow up with PCP in 1 week.      Marietta Lagos MD 9/22/2020 1:46 PM

## 2020-09-22 NOTE — FLOWSHEET NOTE
Attempted to call Pt's room for Advance Directives consult, due to Pt being in droplet plus precautions. No answer on phone. Will attempt to follow up at a later time.     [facility]       09/22/20 8861   Encounter Summary   Services provided to: Patient not available   Continue Visiting   (9/22 AD consult, attempted call, no answer)

## 2020-09-22 NOTE — PROGRESS NOTES
Pt has been educated to hospital falls prevention policy. They are aware they will be assessed every shift, and with any condition changes, by the nursing staff on their ability to perform their ADL's without need for assistance. Pt understands that based on the number of their score they are given a base score that will assign a level of low, medium, or high risk for falls. This patient has rated a high which requires a bed / chair alarm for their safety to prevent a fall. The patient is alert and oriented, and acknowledges and understands the need for intervention but refuses the application and use of the bed / chair alarm that is required per policy. Pt agrees to use call light and wait for help to arrive to assist them to get up when they need to. Call light is within reach and all other safety measures in place. Patient is able to demonstrated the ability to move from a reclining position to an upright position within the recliner. Will continue to monitor.   Cathy Waldrop RN

## 2020-09-22 NOTE — FLOWSHEET NOTE
09/21/20 2049   Vital Signs   Temp 98.3 °F (36.8 °C)   Temp Source Oral   Pulse 99   Heart Rate Source Monitor   Resp 16   /66   BP Location Left upper arm   BP Upper/Lower Upper   Level of Consciousness 0   MEWS Score 1   Oxygen Therapy   SpO2 97 %   O2 Device Nasal cannula   O2 Flow Rate (L/min) 1.5 L/min   Assessment complete, see flowsheets. Pt resting in bed at this time. Urine specimen obtained and sent to lab. PRN medications given per pt request and PRN order parameters. Pt denies further needs at this time, call light within reach, bed alarm refused. Will continue to monitor.   Gwen Chang RN

## 2020-09-22 NOTE — FLOWSHEET NOTE
Followed up with Pt via phone for Advance Directives consult. Pt stated she is no longer in droplet plus precautions and would like  to stop by with copy of forms. Assured her that we would do so. [facility]       09/22/20 9676   Encounter Summary   Services provided to: Patient   Continue Visiting   (9/22 initial, AD consult, stop by with forms)   Complexity of Encounter Low   Length of Encounter 15 minutes   Advance Care Planning Yes   Routine   Type Initial   Assessment Calm; Approachable;Passive   Intervention Active listening   Outcome Expressed gratitude

## 2020-09-22 NOTE — PROGRESS NOTES
Pt family here for p/upt  with pt home 02 tank , staff transported out to waiting car left with belongings.

## 2020-09-22 NOTE — PROGRESS NOTES
Report and transfer of care given to [facility].

## 2020-09-22 NOTE — CARE COORDINATION
DISCHARGE ORDER  Date/Time 2020 11:25 AM  Completed by: Sean Khan, Case Management    Patient Name: Maxime Reasoner      : 1955  Admitting Diagnosis: Acute respiratory failure ([address] [J96.00]      Admit order Date and Status: INPT 2020  (verify MD's last order for status of admission)      Noted discharge order. If applicable PT/OT recommendation at Discharge: n/a  DME recommendation by PT/OT:n/a  Confirmed discharge plan : Yes  with whom_with pt____________  If pt confirmed DC plan does family need to be contacted by CM No  Discharge Plan: reviewed chart and met with pt. Pt reports to live at home with her dtr and is IPTA. Pt states that she has 12 steps at home and is active with [employer] for home O2 at 2lpm that she wears continuously and cpap at night. Pt states that she plans to return home with dtr. Denies need for hhc or other services. Reviewed chart. Role of discharge planner explained and patient verbalized understanding. Discharge order is noted. Has Home O2 in place on admit:  Yes  Informed of need to bring portable home O2 tank on day of discharge for nursing to connect prior to leaving:   Yes  Verbalized agreement/Understanding:   Yes  Pt is being d/c'd to home today. Pt's O2 sats are 95% on 2lpm.    Discharge timeout done with Felisa Dial. All discharge needs and concerns addressed.

## 2020-09-23 ENCOUNTER — TELEPHONE (OUTPATIENT)
Dept: PULMONOLOGY | Age: 65
End: 2020-09-23

## 2020-09-23 LAB — S PYO THROAT QL CULT: NORMAL

## 2020-09-23 NOTE — TELEPHONE ENCOUNTER
Patient's daughter is requesting that an order be sent to [employer] to connect her O2 to her Cpap machine at night, so that she can have the O2 bleed in.     PLease advise. LOV: 7/21/20 Major  ASSESSMENT:  · COPD  · Chronic hypoxic respiratory failure  · Morbid obesity  · DM2  · HTN/HLD  · JAYY on Bipap 19/5 with suboptimal compliance per sleep clinic notes     PLAN:   · Start Anoro  · Start 1lpm o2 with home concetrator and portable conentrator   · Continue PRN albuterol  · Weight loss and exercise  · Screening CT scan was considered in a lung cancer screening counseling and shared decision making visit today that included the following elements:   · Eligibility: Marylynn Schirmer are no signs or symptoms of lung cancer.  Tobacco History 30 pack-years, quit 2015 years ago  · Verbal counseling has been performed by me to include benefits and harms of screening, follow-up diagnostic testing, over-diagnosis, false positive rate, and total radiation exposure;   · I have counseled on the importance of adherence to annual lung cancer LDCT screening, the impact of comorbidities and patient is willing to undergo diagnosis and treatment;   · I have provided counseling on the importance of maintaining cigarette smoking abstinence if former smoker; or the importance of smoking cessation if current smoker and, if appropriate, furnishing of information about tobacco cessation interventions; and   · I have furnished a written order for lung cancer screening with LDCT.    · Order for Screening chest CT scan should be placed with documentation as below:

## 2020-10-07 NOTE — TELEPHONE ENCOUNTER
Spoke to patient and she said that she mailed the device back to [facility] on Monday 10/5/20

## 2020-10-12 NOTE — TELEPHONE ENCOUNTER
Spoke with Kaci from [address] whom states they did receive an email stating pt did mail back to [employer] Kaci states she will check with them and CB

## 2020-10-15 NOTE — TELEPHONE ENCOUNTER
Spoke with Kaci whom states she is still unable to pull the results states she will call [employer] and follow up and keep us updated.

## 2020-10-19 NOTE — TELEPHONE ENCOUNTER
ONPO from 9/27/20 on BIPAP 19/15 review. Showed under 88% for 6.1 min. Please get BIPAP compliance report.   May need increase in pressure and repeat ONPO

## 2020-10-22 NOTE — TELEPHONE ENCOUNTER
BiPAP compliance report from 9/19/2020-10/18/2020 on BiPAP 19/15 cm H2O reviewed. Compliance is good 90%. AHI is good 1.7. Please change to BiPAP 20/16 cm H2O and get ONPO on these settings.   Please watch for results

## 2020-10-26 NOTE — TELEPHONE ENCOUNTER
Patient informed on compliance with a verbal understanding.  Orders pending for pressure change and ONPO

## 2020-10-27 ENCOUNTER — VIRTUAL VISIT (OUTPATIENT)
Dept: PULMONOLOGY | Age: 65
End: 2020-10-27
Payer: COMMERCIAL

## 2020-10-27 PROCEDURE — 99214 OFFICE O/P EST MOD 30 MIN: CPT | Performed by: INTERNAL MEDICINE

## 2020-10-27 NOTE — PROGRESS NOTES
[facility]    Outpatient Follow Up Note    Chief Complaint: COPD  Consulting provider: Elinor Ramirez MD    Interval History: 60 y.o. female normal has been effective. Her shortness of breath has improved and her wheezing has stopped. He recently had a overnight admission for dizziness. No etiology was found. Her PCP has ordered an MRI per patient. Initial HPI:The patient has a history of COPD and JAYY on Bipap. The patient does not have SOB at rest but has JOSEPH. Worse for 3 months. No inhaler use. Exercise tolerance on level ground is 1000 ft. The patient has a daily intermittent cough that is usually dry. The patient does wheeze intermittently most daily.      The patient has smoked 30 pack years Quit in 2015    Current Outpatient Medications:     umeclidinium-vilanterol (ANORO ELLIPTA) 62.5-25 MCG/INH AEPB inhaler, Inhale 1 puff into the lungs daily, Disp: 1 each, Rfl: 5    albuterol sulfate HFA (PROAIR HFA) 108 (90 Base) MCG/ACT inhaler, Inhale 2 puffs into the lungs every 6 hours as needed for Wheezing or Shortness of Breath, Disp: 1 Inhaler, Rfl: 5    Diclofenac (ZORVOLEX) 18 MG CAPS, Take 1 tablet by mouth 2 times daily as needed, Disp: , Rfl:     atorvastatin (LIPITOR) 10 MG tablet, Take 10 mg by mouth daily, Disp: , Rfl:     metFORMIN (GLUCOPHAGE-XR) 500 MG extended release tablet, TK 2 TS PO QD, Disp: , Rfl: 0    SITagliptin (JANUVIA) 50 MG tablet, Take 100 mg by mouth daily , Disp: , Rfl:     escitalopram (LEXAPRO) 20 MG tablet, TK 1 T PO QD, Disp: , Rfl: 3    Objective:   PHYSICAL EXAM: There were no vitals taken for this visit. Constitutional:  No acute distress. Appears well developed and nourished. Eyes: EOM intact. Conjunctivae anicteric. No visible discharge. HENT: Head is normocephalic and atraumatic. Mucus membranes are moist and the tongue appears normal. Normal appearing nose.  External Ears normal.   Neck: No obvious mass and the trachea is midline. Respiratory: No accessory muscle usage. Respiratory effort normal. No visualized signs of difficulty breathing or respiratory distress  Psychiatric: No anxiety or Agitation. Alert and Oriented to person, place and time. Normal judgement and insight. LABS:  Reviewed any pertinent new labs that are available. A1AT 141     PFTs 5/19/15  FVC 2.00l (57%) FEV1 1. 32L  (49%) FEV1/FVC ratio 66 TLC (113%) RV (202%) DLCO (68%) Bronchodilator response: yes  6MWT:  5275 ft and down to 90% on room air  7/17/20  FVC  (%) FEV1 1.22L (42%) FEV1/FVC ratio 0.57 TLC (91%) RV (145%) DLCO (57%) Bronchodilator response: no    6MWT: 840ft, 1lpm    IMAGING:  I personally reviewed and interpreted the following imaging today in the office:   7/13/20   Chest CT:  Lungs/pleura: The tracheobronchial tree is patent. Michael Spatz is no pneumothorax    or pleural effusion.  There is biapical and bibasilar scarring.         There are no suspicious pulmonary nodules.         ASSESSMENT:  · Severe COPD  · Chronic hypoxic respiratory failure  · Morbid obesity  · DM2  · HTN/HLD  · JAYY on Bipap 19/5 with suboptimal compliance per sleep clinic notes     PLAN:   · Continue Anoro  · 1lpm o2 with home concetrator and portable conentrator   · Continue PRN albuterol  · Weight loss and exercise  · F/u in July for COPD and LDCT  · Screening CT scan was considered in a lung cancer screening counseling and shared decision making visit today that included the following elements:   · Eligibility: Age: 62.  There are no signs or symptoms of lung cancer.   Tobacco History 30 pack-years, quit 2015 years   · Verbal counseling has been performed by me to include benefits and harms of screening, follow-up diagnostic testing, over-diagnosis, false positive rate, and total radiation exposure;   · I have counseled on the importance of adherence to annual lung cancer LDCT screening, the impact of comorbidities and patient is willing to undergo diagnosis and treatment;

## 2020-11-10 ENCOUNTER — HOSPITAL ENCOUNTER (OUTPATIENT)
Dept: MRI IMAGING | Age: 65
Discharge: HOME | End: 2020-11-10
Payer: COMMERCIAL

## 2020-11-10 PROCEDURE — 70551 MRI BRAIN STEM W/O DYE: CPT

## 2020-11-12 NOTE — TELEPHONE ENCOUNTER
ONPO 11/3/20 on BIPAP 20/16 cm h2O reviewed, showed under 88% for 0 minutes. No titration or O2 needed at this time. Please make patient aware.     Please get BiPAP download report about 30 days after pressure was changed to BiPAP 20/16 cm H2O (probably around 12/1/2020)

## 2020-12-03 NOTE — TELEPHONE ENCOUNTER
BiPAP compliance report from 11/1/2020-11/30/2020 on BiPAP 20/16 cm H2O reviewed. Compliance is good 97%. AHI is good 0.8.

## 2021-03-22 RX ORDER — UMECLIDINIUM BROMIDE AND VILANTEROL TRIFENATATE 62.5; 25 UG/1; UG/1
1 POWDER RESPIRATORY (INHALATION) DAILY
Qty: 1 EACH | Refills: 5 | Status: SHIPPED | OUTPATIENT
Start: 2021-03-22 | End: 2021-03-30 | Stop reason: CLARIF

## 2021-03-30 RX ORDER — UMECLIDINIUM BROMIDE AND VILANTEROL TRIFENATATE 62.5; 25 UG/1; UG/1
1 POWDER RESPIRATORY (INHALATION) DAILY
Qty: 3 EACH | Refills: 2 | Status: SHIPPED | OUTPATIENT
Start: 2021-03-30 | End: 2022-10-19

## 2021-06-28 ENCOUNTER — VIRTUAL VISIT (OUTPATIENT)
Dept: PULMONOLOGY | Age: 66
End: 2021-06-28
Payer: COMMERCIAL

## 2021-06-28 DIAGNOSIS — E66.01 OBESITY, CLASS III, BMI 40-49.9 (MORBID OBESITY) (HCC): ICD-10-CM

## 2021-06-28 DIAGNOSIS — G47.33 SEVERE OBSTRUCTIVE SLEEP APNEA: Primary | ICD-10-CM

## 2021-06-28 DIAGNOSIS — Z71.89 ENCOUNTER FOR BIPAP USE COUNSELING: ICD-10-CM

## 2021-06-28 PROCEDURE — 99214 OFFICE O/P EST MOD 30 MIN: CPT | Performed by: NURSE PRACTITIONER

## 2021-06-28 RX ORDER — GLIMEPIRIDE 2 MG/1
2 TABLET ORAL
COMMUNITY

## 2021-06-28 RX ORDER — MELOXICAM 15 MG/1
15 TABLET ORAL DAILY
COMMUNITY

## 2021-06-28 RX ORDER — CYANOCOBALAMIN 1000 UG/ML
1000 INJECTION INTRAMUSCULAR; SUBCUTANEOUS ONCE
COMMUNITY

## 2021-06-28 ASSESSMENT — SLEEP AND FATIGUE QUESTIONNAIRES
HOW LIKELY ARE YOU TO NOD OFF OR FALL ASLEEP WHILE SITTING QUIETLY AFTER LUNCH WITHOUT ALCOHOL: 0
HOW LIKELY ARE YOU TO NOD OFF OR FALL ASLEEP WHILE WATCHING TV: 0
ESS TOTAL SCORE: 4
HOW LIKELY ARE YOU TO NOD OFF OR FALL ASLEEP WHEN YOU ARE A PASSENGER IN A CAR FOR AN HOUR WITHOUT A BREAK: 0
HOW LIKELY ARE YOU TO NOD OFF OR FALL ASLEEP WHILE SITTING AND READING: 2
HOW LIKELY ARE YOU TO NOD OFF OR FALL ASLEEP WHILE LYING DOWN TO REST IN THE AFTERNOON WHEN CIRCUMSTANCES PERMIT: 2
HOW LIKELY ARE YOU TO NOD OFF OR FALL ASLEEP IN A CAR, WHILE STOPPED FOR A FEW MINUTES IN TRAFFIC: 0
HOW LIKELY ARE YOU TO NOD OFF OR FALL ASLEEP WHILE SITTING AND TALKING TO SOMEONE: 0
HOW LIKELY ARE YOU TO NOD OFF OR FALL ASLEEP WHILE SITTING INACTIVE IN A PUBLIC PLACE: 0

## 2021-06-28 NOTE — PROGRESS NOTES
Patient ID: Argenis Martin is a 60 y.o. female who is being seen today for   Chief Complaint   Patient presents with    Sleep Apnea     1 year sleep          HPI:     Argenis Martin is a 60 y.o. female for televideo appointment via video and audio doxy. me virtual visit for JAYY follow up. States she is having trouble with the mask, states cleans and replaces regularly but is leaking more lately. Patient is using BiPAP   7-8 hrs/night. Using humidifier. No snoring on BiPAP. The pressure is well tolerated. The mask is comfortable-full face. +mask leak. No significant daytime sleepiness. No nodding off when driving. No dry nose or throat. No fatigue. Bedtime is 11 pm and rise time is 6 am. Sleep onset is 5 minutes. Wakes up 0-1 times at night total. No nocturia. It takes few minutes to fall back a sleep. No naps during the day. No headache in am. No weight gain. 3 caffienated beverages during the day. No alcohol.  ESS is 7      Sleep Medicine 6/28/2021 6/30/2020 4/13/2020 4/8/2019 3/26/2018 3/22/2017 3/8/2016   Sitting and reading 2 2 1 2 1 1 0   Watching TV 0 2 1 2 2 1 1   Sitting, inactive in a public place (e.g. a theatre or a meeting) 0 1 1 2 1 0 1   As a passenger in a car for an hour without a break 0 0 1 2 1 1 1   Lying down to rest in the afternoon when circumstances permit 2 2 2 2 3 3 0   Sitting and talking to someone 0 0 0 0 0 0 0   Sitting quietly after a lunch without alcohol 0 0 1 1 0 1 0   In a car, while stopped for a few minutes in traffic 0 0 0 1 0 0 0   Total score 4 7 7 12 8 7 3   Neck circumference - - - 16 16.25 16 16       Past Medical History:  Past Medical History:   Diagnosis Date    [facility] [facility]     Diabetes mellitus [facility] [address] [facility]     Hypertension     Primary osteoarthritis of right knee 6/21/2018    Sleep apnea        Past Surgical History:        Procedure Laterality Date    CATARACT REMOVAL Left 06/23/2021    CHOLECYSTECTOMY      COLPOSCOPY      KNEE SURGERY         Allergies:  is allergic to penicillins. Social History:    TOBACCO:   reports that she quit smoking about 6 years ago. Her smoking use included cigarettes. She has a 30.00 pack-year smoking history. She has never used smokeless tobacco.  ETOH:   reports no history of alcohol use. Family History:       Problem Relation Age of Onset    Hypertension Mother     Heart Failure Mother        Current Medications:    Current Outpatient Medications:     glimepiride (AMARYL) 2 MG tablet, Take 2 mg by mouth every morning (before breakfast), Disp: , Rfl:     cyanocobalamin 1000 MCG/ML injection, Inject 1,000 mcg into the muscle once, Disp: , Rfl:     meloxicam (MOBIC) 15 MG tablet, Take 15 mg by mouth daily, Disp: , Rfl:     umeclidinium-vilanterol (ANORO ELLIPTA) 62.5-25 MCG/INH AEPB inhaler, Inhale 1 puff into the lungs daily, Disp: 3 each, Rfl: 2    albuterol sulfate HFA (PROAIR HFA) 108 (90 Base) MCG/ACT inhaler, Inhale 2 puffs into the lungs every 6 hours as needed for Wheezing or Shortness of Breath, Disp: 1 Inhaler, Rfl: 5    atorvastatin (LIPITOR) 10 MG tablet, Take 10 mg by mouth daily, Disp: , Rfl:     escitalopram (LEXAPRO) 20 MG tablet, TK 1 T PO QD, Disp: , Rfl: 3    metFORMIN (GLUCOPHAGE-XR) 500 MG extended release tablet, TK 2 TS PO QD, Disp: , Rfl: 0    SITagliptin (JANUVIA) 50 MG tablet, Take 100 mg by mouth daily , Disp: , Rfl:     Diclofenac (ZORVOLEX) 18 MG CAPS, Take 1 tablet by mouth 2 times daily as needed (Patient not taking: Reported on 6/28/2021), Disp: , Rfl:       Objective:   PHYSICAL EXAM:    There were no vitals taken for this visit. Exam:  Gen: No acute distress, does not appear to be in pain. Appears well developed and nourished. HENT: Head is normocephalic and atraumatic. Normal appearing nose. External Ears normal.   Neck: No visualized mass. Trachea is midline   Eyes: EOM intact. No visible discharge.    Resp:No visualized signs of difficulty breathing or respiratory distress, speaking in full sentences. Respiratory effort normal.  Neuro: Awake. Alert. Able to follow commands. No facial asymmetry. Psych: Oriented x 3. No anxiety. Normal affect. DATA:   4/13/2015 HST AHI 42, low SPO2 68%  4/28/2015 titrationcontrolled sleep-related breathing with BiPAP, adequate supply and sleep is not achieved, recommendations auto BiPAP    BIPAP compliance data:  Compliance download report from 2/24/18 to 3/25/18 showed patient is using machine 5:34 hrs/night with 80% compliance and AHI 1.0 within this time frame. 24/30days with greater than 4 hours of machine use. 90% pressure 18.7/14.7 cm H20    Compliance download report from 3/9/19 to 4/7/19 reviewed today by me and showed patient is using machine 4:23 hrs/night with 53.3% compliance and AHI 0.6 within this time frame. 16/30days with greater than 4 hours of machine use. BIPAP 19/15 cm H20    4/13/20- unable to get BIPAP download report. New BIPAP:  Compliance download report from 5/30/20 to 6/28/20 reviewed today by me and showed patient is using machine 6:53 hrs/night with 100% compliance and AHI 0.8 within this time frame. 30/30days with greater than 4 hours of machine use. BIPAP 19/15 cm H20     Compliance download report from 5/29/21 to 6/27/21 reviewed today by me and showed patient is using machine 7:20 hrs/night with 100% compliance and AHI 1.1 within this time frame. 30/30days with greater than 4 hours of machine use. BIPAP 20/16 cm H20    Assessment:      · Severe JAYY. BIPAP 20/16 cm H2O. Optimal compliance and efficacy on review today. · Hypersomnia- improved  · Obesity  · COPDfollowed by Dr. Juan Garcia  · DM and HTN followed by PCP    Plan:     - Continue BiPAP 20/16 cm H2O  -Mask fitting at DME, can change to different mask for comfort/fit if needed  --Discussed [employer] recently recalled some Pap units.   Patient encouraged to call DME to see if her unit is on recall and register unit was limited: Vitals/Constitutional/EENT/Resp/CV/GI//MS/Neuro/Skin/Heme-Lymph-Imm. Pursuant to the emergency declaration under the [address] [place] [address] and the [employer] and [employer] Act, this Virtual Visit was conducted with patient's (and/or legal guardian's) consent, to reduce the patient's risk of exposure to COVID-19 and provide necessary medical care. The patient (and/or legal guardian) has also been advised to contact this office for worsening conditions or problems, and seek emergency medical treatment and/or call 911 if deemed necessary. Patient identification was verified at the start of the visit: Yes    Total time spent for this encounter: Not billed by time    Services were provided through a video synchronous discussion virtually to substitute for in-person clinic visit. Patient and provider were located at their individual homes. --CON Tomas CNP on 6/28/2021 at 3:28 PM    An electronic signature was used to authenticate this note.

## 2021-06-28 NOTE — PATIENT INSTRUCTIONS

## 2021-07-15 ENCOUNTER — HOSPITAL ENCOUNTER (OUTPATIENT)
Dept: CT IMAGING | Age: 66
Discharge: HOME | End: 2021-07-15
Payer: MEDICARE

## 2021-07-15 DIAGNOSIS — Z87.891 PERSONAL HISTORY OF TOBACCO USE, PRESENTING HAZARDS TO HEALTH: ICD-10-CM

## 2021-07-15 PROCEDURE — 71271 CT THORAX LUNG CANCER SCR C-: CPT

## 2021-07-25 ENCOUNTER — TELEPHONE (OUTPATIENT)
Dept: CASE MANAGEMENT | Age: 66
End: 2021-07-25

## 2021-08-04 ENCOUNTER — VIRTUAL VISIT (OUTPATIENT)
Dept: PULMONOLOGY | Age: 66
End: 2021-08-04
Payer: MEDICARE

## 2021-08-04 ENCOUNTER — TELEPHONE (OUTPATIENT)
Dept: PULMONOLOGY | Age: 66
End: 2021-08-04

## 2021-08-04 DIAGNOSIS — G47.33 SEVERE OBSTRUCTIVE SLEEP APNEA: ICD-10-CM

## 2021-08-04 DIAGNOSIS — Z87.891 PERSONAL HISTORY OF TOBACCO USE, PRESENTING HAZARDS TO HEALTH: Primary | ICD-10-CM

## 2021-08-04 DIAGNOSIS — J96.11 CHRONIC HYPOXEMIC RESPIRATORY FAILURE (HCC): ICD-10-CM

## 2021-08-04 PROCEDURE — 99214 OFFICE O/P EST MOD 30 MIN: CPT | Performed by: INTERNAL MEDICINE

## 2021-08-04 NOTE — PROGRESS NOTES
[facility]    Outpatient Follow Up Note    Chief Complaint: COPD  Consulting provider: Dr. Angie Mascorro    Interval History: 73 y.o. female  No sig changes to COPD symptoms, JOSEPH stable    Initial HPI:The patient has a history of COPD and JAYY on Bipap. The patient does not have SOB at rest but has JOSEPH. Worse for 3 months. No inhaler use. Exercise tolerance on level ground is 1000 ft. The patient has a daily intermittent cough that is usually dry. The patient does wheeze intermittently most daily.      The patient has smoked 30 pack years Quit in 2015    Current Outpatient Medications:     glimepiride (AMARYL) 2 MG tablet, Take 2 mg by mouth every morning (before breakfast), Disp: , Rfl:     cyanocobalamin 1000 MCG/ML injection, Inject 1,000 mcg into the muscle once, Disp: , Rfl:     meloxicam (MOBIC) 15 MG tablet, Take 15 mg by mouth daily, Disp: , Rfl:     umeclidinium-vilanterol (ANORO ELLIPTA) 62.5-25 MCG/INH AEPB inhaler, Inhale 1 puff into the lungs daily, Disp: 3 each, Rfl: 2    albuterol sulfate HFA (PROAIR HFA) 108 (90 Base) MCG/ACT inhaler, Inhale 2 puffs into the lungs every 6 hours as needed for Wheezing or Shortness of Breath, Disp: 1 Inhaler, Rfl: 5    Diclofenac (ZORVOLEX) 18 MG CAPS, Take 1 tablet by mouth 2 times daily as needed (Patient not taking: Reported on 6/28/2021), Disp: , Rfl:     atorvastatin (LIPITOR) 10 MG tablet, Take 10 mg by mouth daily, Disp: , Rfl:     escitalopram (LEXAPRO) 20 MG tablet, TK 1 T PO QD, Disp: , Rfl: 3    metFORMIN (GLUCOPHAGE-XR) 500 MG extended release tablet, TK 2 TS PO QD, Disp: , Rfl: 0    SITagliptin (JANUVIA) 50 MG tablet, Take 100 mg by mouth daily , Disp: , Rfl:     Objective:   PHYSICAL EXAM: There were no vitals taken for this visit. Constitutional:  No acute distress. Appears well developed and nourished. Eyes: EOM intact. Conjunctivae anicteric. No visible discharge.    HENT: Head is normocephalic and atraumatic. Mucus membranes are moist and the tongue appears normal. Normal appearing nose. External Ears normal.   Neck: No obvious mass and the trachea is midline. Respiratory: No accessory muscle usage. Respiratory effort normal. No visualized signs of difficulty breathing or respiratory distress  Psychiatric: No anxiety or Agitation. Alert and Oriented to person, place and time. Normal judgement and insight. LABS:  Reviewed any pertinent new labs that are available. A1AT 141     PFTs 5/19/15  FVC 2.00l (57%) FEV1 1. 32L  (49%) FEV1/FVC ratio 66 TLC (113%) RV (202%) DLCO (68%) Bronchodilator response: yes  6MWT:  7837 ft and down to 90% on room air  7/17/20  FVC  (%) FEV1 1.22L (42%) FEV1/FVC ratio 0.57 TLC (91%) RV (145%) DLCO (57%) Bronchodilator response: no    6MWT: 840ft, 1lpm    IMAGING:  I personally reviewed and interpreted the following imaging today in the office:   7/13/20   Chest CT:  Lungs/pleura: The tracheobronchial tree is patent. Lucinda Contes is no pneumothorax    or pleural effusion. Lucinda Contes is biapical and bibasilar scarring.         There are no suspicious pulmonary nodules. 7/15/21 LDCT Chest: No lung nodules     ASSESSMENT:  · Severe COPD  · Chronic hypoxic respiratory failure  · Morbid obesity  · DM2  · HTN/HLD  · JAYY on Bipap 20/16 with suboptimal compliance per sleep clinic notes     PLAN:   · Continue Anoro  · 1lpm O2 with home concetrator and portable concentrator   · Continue PRN albuterol  · Weight loss and exercise  · Bipap  · F/u in 12 mo for screening LDCT  · Screening CT scan was considered in a lung cancer screening counseling and shared decision making visit today that included the following elements:   · Eligibility: Age: 73. There are no signs or symptoms of lung cancer. Tobacco History 30 pack-years, quit 6 years ago.   · Verbal counseling has been performed by me to include benefits and harms of screening, follow-up diagnostic testing, over-diagnosis, false positive rate, and total radiation exposure;   · I have counseled on the importance of adherence to annual lung cancer LDCT screening, the impact of comorbidities and patient is willing to undergo diagnosis and treatment;   · I have provided counseling on the importance of maintaining cigarette smoking abstinence if former smoker; or the importance of smoking cessation if current smoker and, if appropriate, furnishing of information about tobacco cessation interventions; and   · I have furnished a written order for lung cancer screening with LDCT. THIS VISIT WAS COMPLETED VIRTUALLY USING DOXY. ME  Pursuant to the emergency declaration under the [facility], [facility] waiver authority and the [employer] and [employer] Act, this Virtual  Visit was conducted, with patient's consent, to reduce the patient's risk of exposure to COVID-19 and provide continuity of care for an established patient. Services were provided through a video synchronous discussion virtually to substitute for in-person clinic visit.

## 2021-08-04 NOTE — TELEPHONE ENCOUNTER
limited to the following:    Your Provider(s) may not able to provide medical treatment for your particular condition and you may be required to seek alternative healthcare or emergency care services.  The electronic systems or other security protocols or safeguards used in the Service could fail, causing a breach of privacy of your medical or other information.  Given regulatory requirements in certain jurisdictions, your Provider(s) diagnosis and/or treatment options, especially pertaining to certain prescriptions, may be limited. Acceptance   1. You understand that Services will be provided via Telehealth. This process involves the use of HIPAA compliant and secure, real-time audio-visual interfacing with a qualified and appropriately trained provider located at [facility]. 2. You understand that, under no circumstances, will this session be recorded. 3. You understand that the Provider(s) at [facility] and other clinical participants will be party to the information obtained during the Telehealth session in accordance with best medical practices. 4. You understand that the information obtained during the Telehealth session will be used to help determine the most appropriate treatment options. 5. You understand that You have the right to revoke this consent at any point in time. 6. You understand that Telehealth is voluntary, and that continued treatment is not dependent upon consent. 7. You understand that, in the event of non-consent to Telehealth services and/or technical difficulties, you will obtain services as typically provided in the absence of Telehealth technology. 8. You understand that this consent will be kept in Your medical record. 9. No potential benefits from the use of Telehealth or specific results can be guaranteed. Your condition may not be cured or improved and, in some cases, may get worse.    10. There are limitations in the provision of medical care and treatment via Telehealth and the Service and you may not be able to receive diagnosis and/or treatment through the Service for every condition for which you seek diagnosis and/or treatment. 11. There are potential risks to the use of Telehealth, including but not limited to the risks described in this Telehealth Consent. 12. Your Provider(s) have discussed the use of Telehealth and the Service with you, including the benefits and risks of such and you have provided oral consent to your Provider(s) for the use of Telehealth and the Service. 15. You understand that it is your duty to provide your Provider(s) truthful, accurate and complete information, including all relevant information regarding care that you may have received or may be receiving from other healthcare providers outside of the Service. 14. You understand that each of your Provider(s) may determine in his or sole discretion that your condition is not suitable for diagnosis and/or treatment using the Service, and that you may need to seek medical care and treatment a specialist or other healthcare provider, outside of the Service. 15. You understand that you are fully responsible for payment for all services provided by Provider(s) or through use of the Service and that you may not be able to use third-party insurance. 16. You represent that (a) you have read this Telehealth Consent carefully, (b) you understand the risks and benefits of the Service and the use of Telehealth in the medical care and treatment provided to you by Provider(s) using the Service, and (c) you have the legal capacity and authority to provide this consent for yourself and/or the minor for which you are consenting under applicable federal and state laws, including laws relating to the age of [de-identified] and/or parental/guardian consent.    17. You give your informed consent to the use of Telehealth by Provider(s) using the Service under the terms described in the Terms of Service and this Telehealth Consent. The patient was read the following statement and has consented to the visit as of 8/4/21. The patient has been scheduled for their first telehealth visit on 8/4/21 with .

## 2022-06-20 ENCOUNTER — TELEPHONE (OUTPATIENT)
Dept: PULMONOLOGY | Age: 67
End: 2022-06-20

## 2022-06-20 NOTE — TELEPHONE ENCOUNTER
Patient cancelled appointment on 6/27/22 with Yusuf Tran for 1 year sleep. Reason: Provider    Patient did reschedule appointment. Appointment rescheduled for 8/9/22.

## 2022-07-20 ENCOUNTER — HOSPITAL ENCOUNTER (OUTPATIENT)
Dept: CT IMAGING | Age: 67
Discharge: HOME | End: 2022-07-20
Payer: MEDICARE

## 2022-07-20 DIAGNOSIS — Z87.891 PERSONAL HISTORY OF TOBACCO USE, PRESENTING HAZARDS TO HEALTH: ICD-10-CM

## 2022-07-20 PROCEDURE — 71271 CT THORAX LUNG CANCER SCR C-: CPT

## 2022-08-09 ENCOUNTER — TELEMEDICINE (OUTPATIENT)
Dept: SLEEP MEDICINE | Age: 67
End: 2022-08-09
Payer: MEDICARE

## 2022-08-09 DIAGNOSIS — G47.33 SEVERE OBSTRUCTIVE SLEEP APNEA: Primary | ICD-10-CM

## 2022-08-09 DIAGNOSIS — R53.83 OTHER FATIGUE: ICD-10-CM

## 2022-08-09 DIAGNOSIS — Z71.89 ENCOUNTER FOR BIPAP USE COUNSELING: ICD-10-CM

## 2022-08-09 DIAGNOSIS — E66.01 OBESITY, CLASS III, BMI 40-49.9 (MORBID OBESITY) (HCC): ICD-10-CM

## 2022-08-09 PROCEDURE — 1123F ACP DISCUSS/DSCN MKR DOCD: CPT | Performed by: NURSE PRACTITIONER

## 2022-08-09 PROCEDURE — 99213 OFFICE O/P EST LOW 20 MIN: CPT | Performed by: NURSE PRACTITIONER

## 2022-08-09 RX ORDER — LISINOPRIL 10 MG/1
TABLET ORAL
COMMUNITY
Start: 2022-06-20

## 2022-08-09 ASSESSMENT — SLEEP AND FATIGUE QUESTIONNAIRES
HOW LIKELY ARE YOU TO NOD OFF OR FALL ASLEEP WHILE SITTING AND TALKING TO SOMEONE: 0
HOW LIKELY ARE YOU TO NOD OFF OR FALL ASLEEP WHILE WATCHING TV: 0
HOW LIKELY ARE YOU TO NOD OFF OR FALL ASLEEP WHILE LYING DOWN TO REST IN THE AFTERNOON WHEN CIRCUMSTANCES PERMIT: 1
HOW LIKELY ARE YOU TO NOD OFF OR FALL ASLEEP WHILE SITTING AND READING: 0
HOW LIKELY ARE YOU TO NOD OFF OR FALL ASLEEP IN A CAR, WHILE STOPPED FOR A FEW MINUTES IN TRAFFIC: 0
ESS TOTAL SCORE: 1
HOW LIKELY ARE YOU TO NOD OFF OR FALL ASLEEP WHILE SITTING QUIETLY AFTER LUNCH WITHOUT ALCOHOL: 0
HOW LIKELY ARE YOU TO NOD OFF OR FALL ASLEEP WHEN YOU ARE A PASSENGER IN A CAR FOR AN HOUR WITHOUT A BREAK: 0
HOW LIKELY ARE YOU TO NOD OFF OR FALL ASLEEP WHILE SITTING INACTIVE IN A PUBLIC PLACE: 0

## 2022-08-09 NOTE — PROGRESS NOTES
COLPOSCOPY      KNEE SURGERY         Allergies:  is allergic to penicillins. Social History:    TOBACCO:   reports that she quit smoking about 7 years ago. Her smoking use included cigarettes. She has a 30.00 pack-year smoking history. She has never used smokeless tobacco.  ETOH:   reports no history of alcohol use. Family History:       Problem Relation Age of Onset    Hypertension Mother     Heart Failure Mother        Current Medications:    Current Outpatient Medications:     glimepiride (AMARYL) 2 MG tablet, Take 2 mg by mouth every morning (before breakfast), Disp: , Rfl:     cyanocobalamin 1000 MCG/ML injection, Inject 1,000 mcg into the muscle once, Disp: , Rfl:     meloxicam (MOBIC) 15 MG tablet, Take 15 mg by mouth daily, Disp: , Rfl:     albuterol sulfate HFA (PROAIR HFA) 108 (90 Base) MCG/ACT inhaler, Inhale 2 puffs into the lungs every 6 hours as needed for Wheezing or Shortness of Breath, Disp: 1 Inhaler, Rfl: 5    metFORMIN (GLUCOPHAGE-XR) 500 MG extended release tablet, TK 2 TS PO QD, Disp: , Rfl: 0    SITagliptin (JANUVIA) 50 MG tablet, Take 100 mg by mouth daily , Disp: , Rfl:     lisinopril (PRINIVIL;ZESTRIL) 10 MG tablet, , Disp: , Rfl:     umeclidinium-vilanterol (ANORO ELLIPTA) 62.5-25 MCG/INH AEPB inhaler, Inhale 1 puff into the lungs daily (Patient not taking: Reported on 8/9/2022), Disp: 3 each, Rfl: 2    atorvastatin (LIPITOR) 10 MG tablet, Take 10 mg by mouth daily (Patient not taking: Reported on 8/9/2022), Disp: , Rfl:       Objective:   PHYSICAL EXAM:    There were no vitals taken for this visit. Exam:  Gen: No acute distress, does not appear to be in pain. Appears well developed and nourished. HENT: Head is normocephalic and atraumatic. Normal appearing nose. External Ears normal.   Neck: No visualized mass. Trachea is midline   Eyes: EOM intact. No visible discharge. Resp:No visualized signs of difficulty breathing or respiratory distress, speaking in full sentences. Respiratory effort normal.  Neuro: Awake. Alert. Able to follow commands. No facial asymmetry. Psych: Oriented x 3. No anxiety. Normal affect. DATA:   4/13/2015 HST AHI 42, low SPO2 68%  4/28/2015 titration-controlled sleep-related breathing with BiPAP, adequate supply and sleep is not achieved, recommendations auto BiPAP    BIPAP compliance data:  Compliance download report from 2/24/18 to 3/25/18 showed patient is using machine 5:34 hrs/night with 80% compliance and AHI 1.0 within this time frame. 24/30days with greater than 4 hours of machine use. 90% pressure 18.7/14.7 cm H20    Compliance download report from 3/9/19 to 4/7/19 reviewed today by me and showed patient is using machine 4:23 hrs/night with 53.3% compliance and AHI 0.6 within this time frame. 16/30days with greater than 4 hours of machine use. BIPAP 19/15 cm H20    4/13/20- unable to get BIPAP download report. New BIPAP:  Compliance download report from 5/30/20 to 6/28/20 reviewed today by me and showed patient is using machine 6:53 hrs/night with 100% compliance and AHI 0.8 within this time frame. 30/30days with greater than 4 hours of machine use. BIPAP 19/15 cm H20     Compliance download report from 5/29/21 to 6/27/21 reviewed today by me and showed patient is using machine 7:20 hrs/night with 100% compliance and AHI 1.1 within this time frame. 30/30days with greater than 4 hours of machine use. BIPAP 20/16 cm H20    Compliance download report from 7/3/22 to 8/1/22 reviewed today by me and showed patient is using machine 7:25 hrs/night with 100% compliance and AHI 1.7 within this time frame. 30/30days with greater than 4 hours of machine use. BIPAP 20/16 cm H20     Assessment:      Severe JAYY. BIPAP 20/16 cm H2O. Optimal compliance and efficacy on review today.      Hypersomnia- improved  Obesity  COPD-followed by Dr. Naresh Chavez  DM and HTN followed by PCP    Plan:     - Continue BiPAP 20/16 cm H2O  - Advised to use BIPAP 7-8 hrs at night and during naps. - Replacement of mask, tubing, head straps every 3-6 months or sooner if damaged. - Patient instructed to contact DME company for any mask, tubing or machine trouble shooting if problems arise.  - Sleep hygiene  - Avoid sedatives, alcohol and caffeinated drinks at bed time. - Patient counseled to never drive or operate heavy machinery while fatigue, drowsy or sleepy-patient verbalized understanding and agrees. - Weight loss is recommended as a long-term intervention.    - Complications of JAYY if not treated were discussed with patient patient, including: systemic hypertension, pulmonary hypertension, cardiovascular morbidities, car accidents and all cause mortality.  -Patient education regarding sleep tips and PAP cleaning recommendations       --Discussed [employer] recently recalled some Pap units. Patient states she has registered her BiPAP with the  for the recall. At this time, if patients have moderate to severe sleep apnea or have severe daytime sleepiness, it is recommended continue therapy until the machine can be replaced. Based upon the information we have so far, it appears the risk of stopping therapy may be higher than the risks associated with foam degradation. However, there are still many unknown variables and each patient will have to make a final determination on his or her own. Patient states she intends on continuing to use her BiPAP. -Please only use cleaning methods recommended by . Follow up sleep: 1 year, sooner if needed. Follow up with Dr. Ho Starkey as recommended, sooner if needed.

## 2022-08-09 NOTE — PATIENT INSTRUCTIONS
enough blankets to stay warm is recommended. If your room is too noisy, try a white noise machine. If too bright, try black out shades or an eye mask. Dont take worries to bed. Leave worries about work, school etc. behind you when you go to bed. Some people find it helpful to assign a worry period in the evening or late afternoon to write down your worries and get them out of your system. CPAP Equipment Cleaning and Disinfecting Schedule  Equipment Cleaning Frequency Instructions  Disinfecting Frequency   Non-Disposable Filters  Weekly Mild soapy water, Rinse, Air Dry Not Required   Disposable Filters Change as needed  2-4 weeks Do Not Wash Not Required   Hose/tubing Daily Mild soapy water, Rinse, Air Dry Once a week   Mask / Nasal Pillows Daily Mild soapy water, Rinse, Air Dry Once a week   Headgear Weekly Hand wash, Mild soapy water, Rinse, Dry  Not Required   Humidifier Daily Empty water daily  Mild soapy water, Rinse well, Air Dry  Once a week   CPAP Unit As Needed Dust with damp cloth,  No detergents or sprays Not Required         Disinfect (per schedule) with 1 part white vinegar and 3 parts water- soak mask and water chamber for 30 minutes every 1-2 weeks, more often if sick. Allow water/vinegar mixture to run through tubing. Allow all equipment to air dry. Drying Hints:   Always hang tubing away from direct sunlight, as this will cause the tubing to become yellow, brittle and crack over a period of time. DO NOT attach the wet tubing to your CPAP unit to blow-dry it. The moisture from the tubing can drain back into your machine. Moisture in your unit can cause sudden pressure increases or short circuits  DO's and DON'Ts:  - Don't use alcohol-based products to clean your mask, because it can cause the materials to become hard and brittle.    - Don't put headgear in the washer or dryer  - Don't use any caustic or household cleaning solutions such as bleach on your CPAP   equipment.  - Do follow the recommended cleaning schedule. - Do change your disposable filter frequently. Adapted From: [employer]/cleaning. shtm.   These are general suggestions for all models please follow specific s recommendations and specific instructions    Pt was informed that per [employer] website they should call [phone] to see if their machine is included in the recall

## 2022-08-24 ENCOUNTER — TELEPHONE (OUTPATIENT)
Dept: PULMONOLOGY | Age: 67
End: 2022-08-24

## 2022-08-24 NOTE — TELEPHONE ENCOUNTER
Patient cancelled appointment on 8/25/2022 with dr. Jenni fried for COPD follow up former Dr. Mili Downing patient. Reason: pt did not state    Patient did reschedule appointment. Appointment rescheduled for 9/29/2022. Last OV 8/4/2021 with dr. Arreola    ASSESSMENT:  Severe COPD  Chronic hypoxic respiratory failure  Morbid obesity  DM2  HTN/HLD  JAYY on Bipap 20/16 with suboptimal compliance per sleep clinic notes     PLAN:   Continue Anoro  1lpm O2 with home concetrator and portable concentrator   Continue PRN albuterol  Weight loss and exercise  Bipap  F/u in 12 mo for screening LDCT  Screening CT scan was considered in a lung cancer screening counseling and shared decision making visit today that included the following elements:   Eligibility: Age: 73. There are no signs or symptoms of lung cancer. Tobacco History 30 pack-years, quit 6 years ago. Verbal counseling has been performed by me to include benefits and harms of screening, follow-up diagnostic testing, over-diagnosis, false positive rate, and total radiation exposure;   I have counseled on the importance of adherence to annual lung cancer LDCT screening, the impact of comorbidities and patient is willing to undergo diagnosis and treatment;   I have provided counseling on the importance of maintaining cigarette smoking abstinence if former smoker; or the importance of smoking cessation if current smoker and, if appropriate, furnishing of information about tobacco cessation interventions; and   I have furnished a written order for lung cancer screening with LDCT.

## 2022-09-29 ENCOUNTER — OFFICE VISIT (OUTPATIENT)
Dept: PULMONOLOGY | Age: 67
End: 2022-09-29
Payer: MEDICARE

## 2022-09-29 ENCOUNTER — HOSPITAL ENCOUNTER (OUTPATIENT)
Age: 67
Discharge: HOME | End: 2022-09-29
Payer: MEDICARE

## 2022-09-29 VITALS
WEIGHT: 240 LBS | BODY MASS INDEX: 38.57 KG/M2 | HEART RATE: 84 BPM | OXYGEN SATURATION: 97 % | SYSTOLIC BLOOD PRESSURE: 108 MMHG | DIASTOLIC BLOOD PRESSURE: 58 MMHG | HEIGHT: 66 IN

## 2022-09-29 DIAGNOSIS — Z23 NEED FOR INFLUENZA VACCINATION: Primary | ICD-10-CM

## 2022-09-29 DIAGNOSIS — I26.99 OTHER PULMONARY EMBOLISM WITHOUT ACUTE COR PULMONALE, UNSPECIFIED CHRONICITY (HCC): ICD-10-CM

## 2022-09-29 DIAGNOSIS — Z23 NEED FOR INFLUENZA VACCINATION: ICD-10-CM

## 2022-09-29 LAB — D DIMER: 2.62 UG/ML FEU (ref 0–0.6)

## 2022-09-29 PROCEDURE — 1123F ACP DISCUSS/DSCN MKR DOCD: CPT | Performed by: INTERNAL MEDICINE

## 2022-09-29 PROCEDURE — 36415 COLL VENOUS BLD VENIPUNCTURE: CPT

## 2022-09-29 PROCEDURE — G0008 ADMIN INFLUENZA VIRUS VAC: HCPCS | Performed by: INTERNAL MEDICINE

## 2022-09-29 PROCEDURE — 90694 VACC AIIV4 NO PRSRV 0.5ML IM: CPT | Performed by: INTERNAL MEDICINE

## 2022-09-29 PROCEDURE — 85379 FIBRIN DEGRADATION QUANT: CPT

## 2022-09-29 PROCEDURE — 99214 OFFICE O/P EST MOD 30 MIN: CPT | Performed by: INTERNAL MEDICINE

## 2022-09-29 RX ORDER — FLUTICASONE FUROATE, UMECLIDINIUM BROMIDE AND VILANTEROL TRIFENATATE 100; 62.5; 25 UG/1; UG/1; UG/1
1 POWDER RESPIRATORY (INHALATION) DAILY
Qty: 1 EACH | Refills: 3 | Status: SHIPPED | OUTPATIENT
Start: 2022-09-29

## 2022-09-29 NOTE — PROGRESS NOTES
P  [facility]                                               Pulmonary Clinic Consult     I had the pleasure of seeing  Linda Jacobo     Chief Complaint   Patient presents with    COPD     Former Dr. Anel Vang pt       HISTORY OF PRESENT ILLNESS:    Linda Jacobo is a 78y.o. year old  Who has smoked 30 pack years Quit in 2015     The Patient comes in with SOB that has been going on the last few years and it got worse after COVID 2022 August    Associated with  cough ,She  states that it get worse with exercise or walking long distance and he can walk  1 block And go 1-2 flight of stairs before get short winded    She  has cough ,productive for clear Sputum and it is more in the     She has sleep apnea and on CPAP       She is on Anoro and albuterol     ALLERGIES:    Allergies   Allergen Reactions    Penicillins      Throat closes, hives       PAST MEDICAL HISTORY:       Diagnosis Date    COPD (chronic obstructive pulmonary disease) ([facility] [address]     Diabetes mellitus [facility]     Hypertension     Primary osteoarthritis of right knee 6/21/2018    Sleep apnea        MEDICATIONS:   Current Outpatient Medications   Medication Sig Dispense Refill    lisinopril (PRINIVIL;ZESTRIL) 10 MG tablet       glimepiride (AMARYL) 2 MG tablet Take 2 mg by mouth every morning (before breakfast)      cyanocobalamin 1000 MCG/ML injection Inject 1,000 mcg into the muscle once      meloxicam (MOBIC) 15 MG tablet Take 15 mg by mouth daily      umeclidinium-vilanterol (ANORO ELLIPTA) 62.5-25 MCG/INH AEPB inhaler Inhale 1 puff into the lungs daily 3 each 2    albuterol sulfate HFA (PROAIR HFA) 108 (90 Base) MCG/ACT inhaler Inhale 2 puffs into the lungs every 6 hours as needed for Wheezing or Shortness of Breath 1 Inhaler 5    atorvastatin (LIPITOR) 10 MG tablet Take 10 mg by mouth daily      metFORMIN (GLUCOPHAGE-XR) 500 MG extended release tablet TK 2 TS PO QD  0    SITagliptin (JANUVIA) 50 MG tablet Take 100 mg by mouth daily        No current facility-administered medications for this visit. SOCIAL AND OCCUPATIONAL HEALTH:  Social History     Tobacco Use   Smoking Status Former    Packs/day: 1.00    Years: 30.00    Pack years: 30.00    Types: Cigarettes    Quit date: 2015    Years since quittin.6   Smokeless Tobacco Never     TB :no   Pets cat  Industrial exposure:no       SURGICAL HISTORY:   Past Surgical History:   Procedure Laterality Date    CATARACT REMOVAL Left 2021    CHOLECYSTECTOMY      COLPOSCOPY      KNEE SURGERY         FAMILY HISTORY:   Lung cancer:no   DVT or PE son with stroke     REVIEW OF SYSTEMS:  Constitutional: General health is good . There has been no weight changes. No fevers, fatigue or weakness. Head: Patient denies any history of trauma, convulsive disorder or syncope. Skin:  Patient denies history of changes in pigmentation, eruptions or pruritus. No easy bruising or bleeding. EENT: no nasal congestion   Cardiovascular ,No chest pain ,No edema ,  Respiration:SOB: + ,JOSEPH :+  Gastrointestinal:No GI bleed ,no melena  ,no hematemesis    Musculoskeletal: no joint pain ,no swelling  Neurological:no , syncope. Denies twitching, convulsions, loss of consciousness or memory. Endocrine:  . No history of goiter, exophthalmos or dryness of skin. The patient has no history of diabetes. Hematopoietic:  No history of bleeding disorders or easy bruising. Rheumatic:  No connective tissue disease history or polyarthritis/inflammatory joint disease. PHYSICAL EXAMINATION:  Vitals:    22 1127   BP: (!) 108/58   Pulse: 84   SpO2: 97%     Constitutional: This is a well developed, well nourished 78y.o. year old female who is alert, oriented, cooperative and in no apparent distress. Head was normocephalic and atraumatic. EENT: Mallampati class :  Extraocular muscles intact. External canals are patent and no discharge was appreciated.   Septum was

## 2022-09-30 ENCOUNTER — HOSPITAL ENCOUNTER (OUTPATIENT)
Dept: CT IMAGING | Age: 67
Discharge: HOME | End: 2022-09-30
Payer: MEDICARE

## 2022-09-30 ENCOUNTER — HOSPITAL ENCOUNTER (OUTPATIENT)
Age: 67
Discharge: HOME | End: 2022-09-30
Payer: MEDICARE

## 2022-09-30 ENCOUNTER — TELEPHONE (OUTPATIENT)
Dept: PULMONOLOGY | Age: 67
End: 2022-09-30

## 2022-09-30 DIAGNOSIS — R79.89 ELEVATED D-DIMER: Primary | ICD-10-CM

## 2022-09-30 DIAGNOSIS — R79.89 ELEVATED D-DIMER: ICD-10-CM

## 2022-09-30 DIAGNOSIS — I26.99 PULMONARY EMBOLISM WITHOUT ACUTE COR PULMONALE, UNSPECIFIED CHRONICITY, UNSPECIFIED PULMONARY EMBOLISM TYPE (HCC): ICD-10-CM

## 2022-09-30 LAB
CREAT SERPL-MCNC: 1 MG/DL (ref 0.6–1.2)
GFR AFRICAN AMERICAN: >60
GFR NON-AFRICAN AMERICAN: 55

## 2022-09-30 PROCEDURE — 82565 ASSAY OF CREATININE: CPT

## 2022-09-30 PROCEDURE — 6360000004 HC RX CONTRAST MEDICATION: Performed by: INTERNAL MEDICINE

## 2022-09-30 PROCEDURE — 71260 CT THORAX DX C+: CPT | Performed by: INTERNAL MEDICINE

## 2022-09-30 PROCEDURE — 36415 COLL VENOUS BLD VENIPUNCTURE: CPT

## 2022-09-30 RX ADMIN — IOPAMIDOL 75 ML: 755 INJECTION, SOLUTION INTRAVENOUS at 13:47

## 2022-09-30 NOTE — TELEPHONE ENCOUNTER
Per v/o from Dr. Darius See, schedule for STAT CTA pulmonary today. Scheduled pt for CTA at [facility] today STAT. Spoke with pt and informed. Watch for STAT result today.

## 2022-10-11 ENCOUNTER — TELEPHONE (OUTPATIENT)
Dept: CARDIAC REHAB | Age: 67
End: 2022-10-11

## 2022-10-11 ENCOUNTER — HOSPITAL ENCOUNTER (OUTPATIENT)
Dept: CARDIAC REHAB | Age: 67
Setting detail: THERAPIES SERIES
Discharge: HOME | End: 2022-10-11
Payer: MEDICARE

## 2022-10-19 ENCOUNTER — HOSPITAL ENCOUNTER (OUTPATIENT)
Dept: CARDIAC REHAB | Age: 67
Setting detail: THERAPIES SERIES
Discharge: HOME | End: 2022-10-19
Payer: MEDICARE

## 2022-10-19 VITALS
BODY MASS INDEX: 38.46 KG/M2 | OXYGEN SATURATION: 97 % | SYSTOLIC BLOOD PRESSURE: 106 MMHG | WEIGHT: 239.3 LBS | DIASTOLIC BLOOD PRESSURE: 54 MMHG | RESPIRATION RATE: 18 BRPM | HEIGHT: 66 IN

## 2022-10-19 ASSESSMENT — PATIENT HEALTH QUESTIONNAIRE - PHQ9
5. POOR APPETITE OR OVEREATING: 0
SUM OF ALL RESPONSES TO PHQ9 QUESTIONS 1 & 2: 0
4. FEELING TIRED OR HAVING LITTLE ENERGY: 0
3. TROUBLE FALLING OR STAYING ASLEEP: 1
SUM OF ALL RESPONSES TO PHQ QUESTIONS 1-9: 3
2. FEELING DOWN, DEPRESSED OR HOPELESS: 0
9. THOUGHTS THAT YOU WOULD BE BETTER OFF DEAD, OR OF HURTING YOURSELF: 0
SUM OF ALL RESPONSES TO PHQ QUESTIONS 1-9: 3
1. LITTLE INTEREST OR PLEASURE IN DOING THINGS: 0
SUM OF ALL RESPONSES TO PHQ QUESTIONS 1-9: 3
7. TROUBLE CONCENTRATING ON THINGS, SUCH AS READING THE NEWSPAPER OR WATCHING TELEVISION: 0
SUM OF ALL RESPONSES TO PHQ QUESTIONS 1-9: 3
6. FEELING BAD ABOUT YOURSELF - OR THAT YOU ARE A FAILURE OR HAVE LET YOURSELF OR YOUR FAMILY DOWN: 1
10. IF YOU CHECKED OFF ANY PROBLEMS, HOW DIFFICULT HAVE THESE PROBLEMS MADE IT FOR YOU TO DO YOUR WORK, TAKE CARE OF THINGS AT HOME, OR GET ALONG WITH OTHER PEOPLE: 1
8. MOVING OR SPEAKING SO SLOWLY THAT OTHER PEOPLE COULD HAVE NOTICED. OR THE OPPOSITE, BEING SO FIGETY OR RESTLESS THAT YOU HAVE BEEN MOVING AROUND A LOT MORE THAN USUAL: 1

## 2022-10-19 ASSESSMENT — PULMONARY FUNCTION TESTS
FVC (LITERS): 1.96
FEV1 (%PREDICTED): 42
FEV1/FVC: 73

## 2022-10-19 ASSESSMENT — PAIN - FUNCTIONAL ASSESSMENT: PAIN_FUNCTIONAL_ASSESSMENT: PREVENTS OR INTERFERES WITH MANY ACTIVE NOT PASSIVE ACTIVITIES

## 2022-10-19 ASSESSMENT — PAIN DESCRIPTION - PAIN TYPE: TYPE: CHRONIC PAIN

## 2022-10-19 ASSESSMENT — PAIN DESCRIPTION - ONSET: ONSET: ON-GOING

## 2022-10-19 ASSESSMENT — PAIN DESCRIPTION - ORIENTATION: ORIENTATION: LEFT

## 2022-10-19 ASSESSMENT — EXERCISE STRESS TEST
PEAK_RPE: 131
PEAK_HR: 117
PEAK_BP: 134/60
PEAK_METS: 1.5
PEAK_HR: 117
PEAK_BP: 134/60

## 2022-10-19 ASSESSMENT — PAIN DESCRIPTION - FREQUENCY: FREQUENCY: CONTINUOUS

## 2022-10-19 ASSESSMENT — PAIN DESCRIPTION - DESCRIPTORS: DESCRIPTORS: BURNING

## 2022-10-19 ASSESSMENT — PAIN DESCRIPTION - LOCATION: LOCATION: FOOT

## 2022-10-19 ASSESSMENT — PAIN SCALES - GENERAL: PAINLEVEL_OUTOF10: 5

## 2022-10-19 NOTE — PROGRESS NOTES
Patient completed initial assessment for pulmonary rehab. Patient takes care of her ill son and daughter. Due to their schedule patient cant attend rehab on T/TH schedule. Patient can come on Monday/Friday's at 0915 am. Patient will start on 10/21/22.

## 2022-10-19 NOTE — PLAN OF CARE
Individual Treatment Plan  Pulmonary Rehabilitation    Initial Assessment  Name: Germania Isbell Admit to Rehab: 10/19/2022   : 1955 Treatment Diagnosis: Treatment Diagnosis 1: COPD    Age: 78 y.o. Referring Physician: Jessica Marroquin   MRN: [id number] Primary Care Physician: Freddy Ching MD     Allergies   Allergen Reactions    Penicillins      Throat closes, hives       Date: 10/19/2022    Patient Goals: Increase activity level and lose weight. Oxygen Assessment  Stages of Change:  N/A    Inhalers: yes Nebulizers: no   07/15/2020 Pulmonary function test:    FVC: 1.96  FEV1:42  FEV1/FVC: 73  DLCO: 57    Shortness of Breath Assessment:   Dyspnea (Shortness of Breath) Evaluation  Resting, Lying Down: Not at all  Walking on a Level at Your Own Pace: Very slight  Walking on a Level with Others Your Age: Very slight  Walking Up a Hill: Moderately  Walking Up Stairs: Moderately  While Eating: Not at all  Standing Up From a Chair: Not at all  Brushing Teeth: Not at all  Shaving and/or Brushing Hair: Not at all  Showering/Bathing: Not at all  Dressing: Not at all  Picking Up and Straightening: Very slight  Doing Dishes: Very slight  Sweeping/Vacuuming: Very slight  Making Bed: Very slight  Shopping: Moderately  Doing Laundry: Moderately  Washing Car: Moderately  Mowing Lawn: Maximally or unable to do because of breathlessness  Watering Lawn: Very slight  Sexual Activities: Not at all  How Much Does Shortness of Breath Limit You In Your Daily Life: Moderately  How Much Does the Fear of \"Hurting Myself\" by Overexerting Limit You in Your Daily Life: Not at all  How Much does the Fear of Future Shortness of Breath Limit You in Your Daily Life: Not at all  Dyspnea (Shortness of Breath) Evaluation Total Score: 30    Target Goals  Utilize proper breathing mechanics and techniques. , Symptom management, Increase endurance/stamina, and SpO2% > 90% during exercise     Education  Ms.  Antonio Alfredo was educated on the importance of pursed lip breathing, dyspnea management, inhaled and pulmonary medication compliance, exacerbation prevention and management, signs and symptoms to report, and pulmonary anatomy and physiology  Exercise Assessment  Stages of Change: Action  Fall Risk: low  Assistive Devices:None  6 Minute Walk Test:   Pre-Test Vitals: Data Measured Before Walk  Heart Rate: 88  Blood Pressure: 104/54  O2 Saturation: 97  O2 Device: Room air  RPD: 0;       Peak Vitals: Data Measured Immediately After Walk  Distance Walked (ft): 805 ft (Patient has L foot plantar fascitis)  Peak Heart Rate: 117  Peak Blood Pressure: 134/60  Modified Wen Scale: No breathlessness at all  RPE: 13/20 (due to foot pain)  O2 Saturation: 92  O2 Flow Rate (L/min): 0 l/min  Met: 1.5    During:     Number of Rest: 0    Post-Test Vitals: Data Measured at 5 Minutes After Walk  Heart Rate: 90  Blood Pressure: 108/58  SpO2: 98 %  O2 Device: Room air    Key Anti-Hypertensive Meds            lisinopril (PRINIVIL;ZESTRIL) 10 MG tablet    Class: Historical Med            Angina with exercise: no  Dyspnea with exercise:no      Exercise Prescription        Mode: Treadmill, Bike (Upright or recumbent), NuStep, Rower, SciFit, Airdyne, Arm Ergometer, Recumbent Eliptical, and Walking      Frequency: 2-3 days/week      Intensity: RPE 11-13 / DR 3-4      Duration: 20-30+ minutes/day      Resistance Training:Yes, 3 days per week      Progression: Increase exercise by 0.5 METs every 1 to 2 weeks to goal of 4 to 6 METs      Supplemental oxygen: No      Target SPO2: >90% with exertion    Plan  Home Exercise: 30+ minutes, 5 days/week  Mode: Walking  Resistance Training: Yes: Bands and Weights    Target Goals  Achieve exercise to 3-4.9 METs , Adhere to 5 days per week exercise program , Adhere to independent aerobic home exercise program, [facility], fitness center , and Increase exercise endurance and stamina     Education  Ms. Noni Deanes was educated on the importance of warm up and cool down, RPE scale, Wen Scale of Perceived Exertion use, Importance of physical activity and exercise progression, and home exercise program    Nutrition Assessment  Stages of Change:Action  Height: 5' 6\" (167.6 cm) Weight: 239 lb 4.8 oz (108.5 kg) BMI (Calculated): 38.7     Nutrition Survey: Rate Your Plate Score: Rate Your Plate Total Score: 51     Patient Weight Goal: 140 lb   Recommended Diet: minimize processed foods, reduce sodium intake, minimize simple sugars, and increase fiber intake  Diabetic: Yes; Monitors blood sugars as prescribed No  Diabetic Medications: glimepiride - 2 MG  metFORMIN - 500 MG  SITagliptin - 50 MG   Key Antihyperglycemic Medications            glimepiride (AMARYL) 2 MG tablet    Class: Historical Med    metFORMIN (GLUCOPHAGE-XR) 500 MG extended release tablet    Class: Historical Med    SITagliptin (JANUVIA) 50 MG tablet    Class: Historical Med           Key Hyperlipidemia Meds            atorvastatin (LIPITOR) 10 MG tablet    Class: Historical Med                 Lab Results   Component Value Date    GLUCOSE 224 (H) 09/22/2020    LABA1C 10.2 09/21/2020    CHOL 166 03/19/2017    HDL 39 (L) 03/19/2017    LDLCALC 95 03/19/2017    TRIG 159 (H) 03/19/2017         Nutrition Intervention  Attend education classes related to nutrition, Complete diet survey, and Participate in an exercise program that promotes weight loss    Target Goals  Articulate heart healthy diet  and Reduce weight     Education  Ms. Kellen Casper was educated on the importance of maintaining optimal weight/BMI and nutrition guidelines    Psychosocial Assessment  Stages of Change:Action  Occupation: works remotely at home     Psychosocial Test  PHQ-9  10/19/2022   [address] [place] [address] interest or pleasure in doing things 0   Feeling down, depressed, or hopeless 0   Trouble falling or staying asleep, or sleeping too much 1   Feeling tired or having little energy 0   Poor appetite or overeating 0   Feeling bad about yourself - or that you are a failure or have let yourself or your family down 1   Trouble concentrating on things, such as reading the newspaper or watching television 0   Moving or speaking so slowly that other people could have noticed. Or the opposite - being so fidgety or restless that you have been moving around a lot more than usual 1   Thoughts that you would be better off dead, or of hurting yourself in some way 0   PHQ-2 Score 0   PHQ-9 Total Score 3   If you checked off any problems, how difficult have these problems made it for you to do your work, take care of things at home, or get along with other people? 1         Quality of Life Screening:COPD Assessment Test (CAT)  Cough : 2  Phlegm (mucus): 1  Chest Tightness: Chest does not feel tight at all  Breathless When Walking Up a Hill or Flight of Steps: Very breathless  Activities at Home: 3  Confident Leaving Home Due to Lung Condition: Confident leaving home despite my lung condition  Sleep Soundly: 2  Energy Level: 3  CAT Total Score : 16   Reports psychosocial symptoms: yes  Currently on medication therapy: no  Currently seeing a mental health professional: no  Positive support system: Patient takes care of her son and daughter at home. Son is on dialysis and daughter has pulmonary fibrosis.      Psychosocial Intervention  Attend education classes related to stress management and relaxation and Participate in an exercise program that improves psychosocial symptoms    Target Goals  Maximize stress management/coping skills     Education Assessment  Stages of Change: Action  Barriers to Learning: No barriers  Personal Learning Style: Verbal, Video, and Written  Social History     Tobacco Use   Smoking Status Former    Packs/day: 1.00    Years: 30.00    Pack years: 30.00    Types: Cigarettes    Quit date: 2015    Years since quittin.7   Smokeless Tobacco Never       Education Intervention/Learning Needs Identified  Exercise, Nutrition, Stress management and relaxation, and Weight management    Target Goals  Attend appropriate web education classes  and Restore to highest level of independent functionality       Provider Review and Approval of this ITP    The above treatment plan and goals have been set for your patient during Pulmonary Rehabilitation. Please review and Electronically Cosign/ or Sign (if Fax Provider)            *Please comment and make changes to the EX RX or treatment plan if needed*                       Electronic Provider comment:              Please indicate with Cosign Comment.            Electronically signed by Buster Mathew RN on 10/19/22 at 6:37 AM EDT

## 2022-10-21 ENCOUNTER — HOSPITAL ENCOUNTER (OUTPATIENT)
Dept: CARDIAC REHAB | Age: 67
Setting detail: THERAPIES SERIES
Discharge: HOME | End: 2022-10-21
Payer: MEDICARE

## 2022-10-21 PROCEDURE — 94625 PHY/QHP OP PULM RHB W/O MNTR: CPT

## 2022-10-24 ENCOUNTER — HOSPITAL ENCOUNTER (OUTPATIENT)
Dept: CARDIAC REHAB | Age: 67
Setting detail: THERAPIES SERIES
Discharge: HOME | End: 2022-10-24
Payer: MEDICARE

## 2022-10-24 VITALS — WEIGHT: 239 LBS | BODY MASS INDEX: 38.58 KG/M2

## 2022-10-24 PROCEDURE — 94625 PHY/QHP OP PULM RHB W/O MNTR: CPT

## 2022-10-24 NOTE — PLAN OF CARE
Individual Treatment Plan  Pulmonary Rehabilitation    Initial Assessment  Name: Aryan Partida Admit to Rehab: 10/24/2022   : 1955 Treatment Diagnosis:      Age: 78 y.o. Referring Physician: Izabel Meade   MRN: [id number] Primary Care Physician: Lisbeth Choi MD     Allergies   Allergen Reactions    Penicillins      Throat closes, hives       Date: 10/24/2022    Patient Goals: Increase activity level and lose weight. Oxygen Assessment  Stages of Change:  N/A    Inhalers: yes Nebulizers: no   07/15/2020 Pulmonary function test:    FVC: 1.96  FEV1:42  FEV1/FVC: 73  DLCO: 57    Shortness of Breath Assessment:        Target Goals  Utilize proper breathing mechanics and techniques. , Symptom management, Increase endurance/stamina, and SpO2% > 90% during exercise     Education  Ms. Olive Molina was educated on the importance of pursed lip breathing, dyspnea management, inhaled and pulmonary medication compliance, exacerbation prevention and management, signs and symptoms to report, and pulmonary anatomy and physiology  Exercise Assessment  Stages of Change: Action  Fall Risk: low  Assistive Devices:None  6 Minute Walk Test:   Pre-Test Vitals:  ;       Peak Vitals:    Met: 1.5    During:     Number of Rest: 0    Post-Test Vitals:      Key Anti-Hypertensive Meds            lisinopril (PRINIVIL;ZESTRIL) 10 MG tablet    Class: Historical Med            Angina with exercise: no  Dyspnea with exercise:no      Exercise Prescription        Mode: Treadmill, Bike (Upright or recumbent), NuStep, Rower, SciFit, Airdyne, Arm Ergometer, Recumbent Eliptical, and Walking      Frequency: 2-3 days/week      Intensity: RPE 11-13 / DR 3-4      Duration: 20-30+ minutes/day      Resistance Training:Yes, 3 days per week      Progression: Increase exercise by 0.5 METs every 1 to 2 weeks to goal of 4 to 6 METs      Supplemental oxygen: No      Target SPO2: >90% with exertion    Plan  Home Exercise: 30+ minutes, 5 days/week  Mode: Walking  Resistance Training: Yes: Bands and Weights    Target Goals  Achieve exercise to 3-4.9 METs , Adhere to 5 days per week exercise program , Adhere to independent aerobic home exercise program, [facility], fitness center , and Increase exercise endurance and stamina     Education  Ms. Bruno Hoffmann was educated on the importance of warm up and cool down, RPE scale, Wen Scale of Perceived Exertion use, Importance of physical activity and exercise progression, and home exercise program    Nutrition Assessment  Stages of Change:Action            Nutrition Survey: Rate Your Plate Score:       Patient Weight Goal: 140 lb   Recommended Diet: minimize processed foods, reduce sodium intake, minimize simple sugars, and increase fiber intake  Diabetic: Yes; Monitors blood sugars as prescribed No  Diabetic Medications: glimepiride - 2 MG  metFORMIN - 500 MG  SITagliptin - 50 MG   Key Antihyperglycemic Medications            glimepiride (AMARYL) 2 MG tablet    Class: Historical Med    metFORMIN (GLUCOPHAGE-XR) 500 MG extended release tablet    Class: Historical Med    SITagliptin (JANUVIA) 50 MG tablet    Class: Historical Med           Key Hyperlipidemia Meds            atorvastatin (LIPITOR) 10 MG tablet    Class: Historical Med                 Lab Results   Component Value Date    GLUCOSE 224 (H) 09/22/2020    LABA1C 10.2 09/21/2020    CHOL 166 03/19/2017    HDL 39 (L) 03/19/2017    LDLCALC 95 03/19/2017    TRIG 159 (H) 03/19/2017         Nutrition Intervention  Attend education classes related to nutrition, Complete diet survey, and Participate in an exercise program that promotes weight loss    Target Goals  Articulate heart healthy diet  and Reduce weight     Education  Ms. Bruno Hoffmann was educated on the importance of maintaining optimal weight/BMI and nutrition guidelines    Psychosocial Assessment  Stages of Change:Action  Occupation: works remotely at home     Psychosocial Test  PHQ-9  10/19/2022   [address] [facility] interest or pleasure in doing things 0   Feeling down, depressed, or hopeless 0   Trouble falling or staying asleep, or sleeping too much 1   Feeling tired or having little energy 0   Poor appetite or overeating 0   Feeling bad about yourself - or that you are a failure or have let yourself or your family down 1   Trouble concentrating on things, such as reading the newspaper or watching television 0   Moving or speaking so slowly that other people could have noticed. Or the opposite - being so fidgety or restless that you have been moving around a lot more than usual 1   Thoughts that you would be better off dead, or of hurting yourself in some way 0   PHQ-2 Score 0   PHQ-9 Total Score 3   If you checked off any problems, how difficult have these problems made it for you to do your work, take care of things at home, or get along with other people? 1         Quality of Life Screening:    Reports psychosocial symptoms: yes  Currently on medication therapy: no  Currently seeing a mental health professional: no  Positive support system: Patient takes care of her son and daughter at home. Son is on dialysis and daughter has pulmonary fibrosis.      Psychosocial Intervention  Attend education classes related to stress management and relaxation and Participate in an exercise program that improves psychosocial symptoms    Target Goals  Maximize stress management/coping skills     Education Assessment  Stages of Change: Action  Barriers to Learning: No barriers  Personal Learning Style: Verbal, Video, and Written  Social History     Tobacco Use   Smoking Status Former    Packs/day: 1.00    Years: 30.00    Pack years: 30.00    Types: Cigarettes    Quit date: 2015    Years since quittin.7   Smokeless Tobacco Never       Education Intervention/Learning Needs Identified  Exercise, Nutrition, Stress management and relaxation, and Weight management    Target Goals  Attend appropriate web education classes  and Restore to highest level of independent functionality       Provider Review and Approval of this ITP    The above treatment plan and goals have been set for your patient during Pulmonary Rehabilitation. Please review and Electronically Cosign/ or Sign (if Fax Provider)            *Please comment and make changes to the EX RX or treatment plan if needed*                       Electronic Provider comment:              Please indicate with Cosign Comment.            Electronically signed by Rubén Hanson RN on 10/19/22 at 6:37 AM EDT

## 2022-10-28 ENCOUNTER — HOSPITAL ENCOUNTER (OUTPATIENT)
Dept: CARDIAC REHAB | Age: 67
Setting detail: THERAPIES SERIES
Discharge: HOME | End: 2022-10-28
Payer: MEDICARE

## 2022-10-28 VITALS — BODY MASS INDEX: 38.25 KG/M2 | WEIGHT: 237 LBS

## 2022-10-28 PROCEDURE — 94625 PHY/QHP OP PULM RHB W/O MNTR: CPT

## 2022-10-28 NOTE — PROGRESS NOTES
Cardiopulmonary Rehab Session     Rehab Common Questions  Any Problems or Changes Since Your Last Visit : Denies    Exercise Treatment Log  Weight: 237 lb (107.5 kg)     Session Information  Phase: Phase II  Session Number: 3/36  Weight: 237 lb (107.5 kg)    Pre-Session Evaluation  Is patient on O2?: N  Cardiac Discomfort: N  Musculoskeletal Discomfort: No  Pulmonary Discomfort: N  Exercise Limitations: No    Resting Vital Signs  Resting Heart Rate: 88  Resting BP: 120/68  Resting Dyspnea: No  Resting Sa02: 98        Weights Exercise Assessment  Workload/Speed: 4 lb       Other Exercise Assessment  Exercise Type: nustep  Workload/Speed: 75 turcios  Exercise Duration in Minutes: 32  Exercise Intensity: Easy  RPE: 11/20  Exercise HR: 106  Exercise BP: 126/70  Exercise Dyspnea: N  Exercise SaO2: 96    Recovery Vital Signs  Recovery HR: 81  Recovery BP: 104/60  Recovery Sa02: 93     Education:   warm up and cool down, exercise safety, and equipment orientation  nutrition guidelines, relaxation techniques

## 2022-11-07 ENCOUNTER — HOSPITAL ENCOUNTER (OUTPATIENT)
Dept: CARDIAC REHAB | Age: 67
Setting detail: THERAPIES SERIES
Discharge: HOME | End: 2022-11-07

## 2022-11-10 ENCOUNTER — HOSPITAL ENCOUNTER (OUTPATIENT)
Dept: WOMENS IMAGING | Age: 67
Discharge: HOME | End: 2022-11-10
Payer: MEDICARE

## 2022-11-10 DIAGNOSIS — Z12.31 ENCOUNTER FOR SCREENING MAMMOGRAM FOR BREAST CANCER: ICD-10-CM

## 2022-11-10 PROCEDURE — 77067 SCR MAMMO BI INCL CAD: CPT

## 2022-11-14 ENCOUNTER — TELEPHONE (OUTPATIENT)
Dept: CARDIAC REHAB | Age: 67
End: 2022-11-14

## 2022-11-14 ENCOUNTER — HOSPITAL ENCOUNTER (OUTPATIENT)
Dept: CARDIAC REHAB | Age: 67
Setting detail: THERAPIES SERIES
Discharge: HOME | End: 2022-11-14

## 2022-11-14 NOTE — TELEPHONE ENCOUNTER
Patient has not been to pulmonary rehab the month of November. Left voicemail requesting patient call back with update on patient's plans for rehab.

## 2022-11-21 ENCOUNTER — HOSPITAL ENCOUNTER (OUTPATIENT)
Dept: WOMENS IMAGING | Age: 67
Discharge: HOME | End: 2022-11-21
Payer: MEDICARE

## 2022-11-21 ENCOUNTER — HOSPITAL ENCOUNTER (OUTPATIENT)
Dept: CARDIAC REHAB | Age: 67
Setting detail: THERAPIES SERIES
Discharge: HOME | End: 2022-11-21

## 2022-11-21 DIAGNOSIS — R92.8 ABNORMAL MAMMOGRAM: ICD-10-CM

## 2022-11-21 PROCEDURE — 76882 US LMTD JT/FCL EVL NVASC XTR: CPT

## 2022-11-21 NOTE — PROGRESS NOTES
PT Received from PACU, Pt Aox4, VSS on 3L O2 via NC, ptdrowsy but easily arousable, no sings of labored breathing or distress noted, pt denies any pain or discomfort at this time. Admission completed, home medications updated, pt on high fall precautions. Abel with anchor device in place. Will continue plan of care.     0922 PT complaining of pain 8/10, MD made aware, orders received.     0200 PT abel removed    0530 Pt had successful void.        Patient discharged from pulmonary rehab due to lack of attendance.

## 2023-05-04 ENCOUNTER — TELEPHONE (OUTPATIENT)
Dept: PULMONOLOGY | Age: 68
End: 2023-05-04

## 2023-05-04 NOTE — TELEPHONE ENCOUNTER
Patient did not show for 6 month COPD appointment  with Dr. Alfredo Beck on 5/4/23    Same Day Cancellation: No    Patient rescheduled:  No    New appointment:     Patient was also no show on: na    LOV     IMAGING:  I personally reviewed and   IMPRESSION:    1-Severe COPD  2-Recent COVID  3-mild hypoxia   4-JAYY                PLAN:      -change to trelegy  -will check d dimer   -albuterol as needed  -CT next July ,screen   Keep CPAP

## 2023-08-18 ENCOUNTER — TELEMEDICINE (OUTPATIENT)
Dept: PULMONOLOGY | Age: 68
End: 2023-08-18
Payer: MEDICARE

## 2023-08-18 DIAGNOSIS — I10 PRIMARY HYPERTENSION: ICD-10-CM

## 2023-08-18 DIAGNOSIS — G47.33 SEVERE OBSTRUCTIVE SLEEP APNEA: Primary | ICD-10-CM

## 2023-08-18 DIAGNOSIS — E66.9 OBESITY (BMI 30-39.9): ICD-10-CM

## 2023-08-18 DIAGNOSIS — Z71.89 ENCOUNTER FOR BIPAP USE COUNSELING: ICD-10-CM

## 2023-08-18 PROCEDURE — 1090F PRES/ABSN URINE INCON ASSESS: CPT | Performed by: NURSE PRACTITIONER

## 2023-08-18 PROCEDURE — 99214 OFFICE O/P EST MOD 30 MIN: CPT | Performed by: NURSE PRACTITIONER

## 2023-08-18 PROCEDURE — G8400 PT W/DXA NO RESULTS DOC: HCPCS | Performed by: NURSE PRACTITIONER

## 2023-08-18 PROCEDURE — 1123F ACP DISCUSS/DSCN MKR DOCD: CPT | Performed by: NURSE PRACTITIONER

## 2023-08-18 PROCEDURE — 3017F COLORECTAL CA SCREEN DOC REV: CPT | Performed by: NURSE PRACTITIONER

## 2023-08-18 PROCEDURE — G8427 DOCREV CUR MEDS BY ELIG CLIN: HCPCS | Performed by: NURSE PRACTITIONER

## 2023-08-18 ASSESSMENT — SLEEP AND FATIGUE QUESTIONNAIRES
HOW LIKELY ARE YOU TO NOD OFF OR FALL ASLEEP WHILE SITTING AND READING: 1
HOW LIKELY ARE YOU TO NOD OFF OR FALL ASLEEP WHILE SITTING AND READING: 0
HOW LIKELY ARE YOU TO NOD OFF OR FALL ASLEEP WHILE SITTING QUIETLY AFTER LUNCH WITHOUT ALCOHOL: 0
ESS TOTAL SCORE: 2
HOW LIKELY ARE YOU TO NOD OFF OR FALL ASLEEP WHILE LYING DOWN TO REST IN THE AFTERNOON WHEN CIRCUMSTANCES PERMIT: 1
HOW LIKELY ARE YOU TO NOD OFF OR FALL ASLEEP WHILE SITTING QUIETLY AFTER LUNCH WITHOUT ALCOHOL: 1
HOW LIKELY ARE YOU TO NOD OFF OR FALL ASLEEP WHEN YOU ARE A PASSENGER IN A CAR FOR AN HOUR WITHOUT A BREAK: 2
HOW LIKELY ARE YOU TO NOD OFF OR FALL ASLEEP WHILE LYING DOWN TO REST IN THE AFTERNOON WHEN CIRCUMSTANCES PERMIT: 1
HOW LIKELY ARE YOU TO NOD OFF OR FALL ASLEEP WHILE WATCHING TV: 1
HOW LIKELY ARE YOU TO NOD OFF OR FALL ASLEEP WHILE WATCHING TV: 0
HOW LIKELY ARE YOU TO NOD OFF OR FALL ASLEEP WHILE SITTING AND TALKING TO SOMEONE: 1
HOW LIKELY ARE YOU TO NOD OFF OR FALL ASLEEP WHILE SITTING INACTIVE IN A PUBLIC PLACE: 0
HOW LIKELY ARE YOU TO NOD OFF OR FALL ASLEEP IN A CAR, WHILE STOPPED FOR A FEW MINUTES IN TRAFFIC: 1
HOW LIKELY ARE YOU TO NOD OFF OR FALL ASLEEP WHEN YOU ARE A PASSENGER IN A CAR FOR AN HOUR WITHOUT A BREAK: 1
HOW LIKELY ARE YOU TO NOD OFF OR FALL ASLEEP WHILE SITTING AND TALKING TO SOMEONE: 0
HOW LIKELY ARE YOU TO NOD OFF OR FALL ASLEEP IN A CAR, WHILE STOPPED FOR A FEW MINUTES IN TRAFFIC: 0
HOW LIKELY ARE YOU TO NOD OFF OR FALL ASLEEP WHILE SITTING INACTIVE IN A PUBLIC PLACE: 1
ESS TOTAL SCORE: 9

## 2023-08-18 NOTE — PROGRESS NOTES
Virtual Visit was conducted with patient's (and/or legal guardian's) consent. Patient identification was verified, and a caregiver was present when appropriate. The patient was located at Home: [address] [facility]  Provider was located at Home ([address] [facility]): [facility] [address]         Total time spent for this encounter: Not billed by time    --CON Alvarez CNP on 8/18/2023 at 10:41 AM    An electronic signature was used to authenticate this note.

## 2023-10-10 ENCOUNTER — TELEPHONE (OUTPATIENT)
Dept: TELEMETRY | Age: 68
End: 2023-10-10

## 2023-10-10 NOTE — TELEPHONE ENCOUNTER
Patient due for annual CT Lung Screening. Reminder letter mailed. Order pending in chart. Routed to PCP.     Luke Amaro RN

## 2023-11-20 ENCOUNTER — TELEPHONE (OUTPATIENT)
Dept: PULMONOLOGY | Age: 68
End: 2023-11-20

## 2023-11-20 NOTE — TELEPHONE ENCOUNTER
BiPAP download report from 10/21/2023 - 11/19/2023 on BiPAP 20/16 cm H2O reviewed. Compliance is good 100%. AHI is good 1.6. Report is showing very large leak 3 hours nightly, this was not the case when seen in August, only showed 11 minutes leak at that time. Does patient feel like her mask is leaking? If so [place] [facility] [place] Would also check tubing to make sure there are no holes.   If no improvement with this patient should call office back for possible pressure adjustment

## 2023-11-20 NOTE — TELEPHONE ENCOUNTER
Pt called stated that her family is telling her she is snoring with her cpap on and she stated that she is also very tired when she wakes up. Pt stated she didn't know if she needed an appointment or if this is something deloris can call her for. Please advise.

## 2023-11-27 NOTE — TELEPHONE ENCOUNTER
Spoke to pt regarding the 3hr leak at night. Pt states that she knows she feels as bad as she did before the cpap. She will go get a mask fitting at the DME and if this is still going on after she will make an appt to get pressure changed.

## 2024-08-16 ENCOUNTER — TELEPHONE (OUTPATIENT)
Dept: PULMONOLOGY | Age: 69
End: 2024-08-16

## 2024-08-16 ENCOUNTER — OFFICE VISIT (OUTPATIENT)
Dept: PULMONOLOGY | Age: 69
End: 2024-08-16
Payer: MEDICARE

## 2024-08-16 VITALS
WEIGHT: 233 LBS | HEIGHT: 66 IN | HEART RATE: 98 BPM | OXYGEN SATURATION: 91 % | DIASTOLIC BLOOD PRESSURE: 56 MMHG | BODY MASS INDEX: 37.45 KG/M2 | SYSTOLIC BLOOD PRESSURE: 130 MMHG

## 2024-08-16 DIAGNOSIS — E66.9 OBESITY (BMI 30-39.9): ICD-10-CM

## 2024-08-16 DIAGNOSIS — I10 PRIMARY HYPERTENSION: ICD-10-CM

## 2024-08-16 DIAGNOSIS — Z71.89 ENCOUNTER FOR BIPAP USE COUNSELING: ICD-10-CM

## 2024-08-16 DIAGNOSIS — G47.33 SEVERE OBSTRUCTIVE SLEEP APNEA: Primary | ICD-10-CM

## 2024-08-16 PROCEDURE — G8400 PT W/DXA NO RESULTS DOC: HCPCS | Performed by: NURSE PRACTITIONER

## 2024-08-16 PROCEDURE — 3017F COLORECTAL CA SCREEN DOC REV: CPT | Performed by: NURSE PRACTITIONER

## 2024-08-16 PROCEDURE — 3075F SYST BP GE 130 - 139MM HG: CPT | Performed by: NURSE PRACTITIONER

## 2024-08-16 PROCEDURE — 1036F TOBACCO NON-USER: CPT | Performed by: NURSE PRACTITIONER

## 2024-08-16 PROCEDURE — G8417 CALC BMI ABV UP PARAM F/U: HCPCS | Performed by: NURSE PRACTITIONER

## 2024-08-16 PROCEDURE — 3078F DIAST BP <80 MM HG: CPT | Performed by: NURSE PRACTITIONER

## 2024-08-16 PROCEDURE — 1090F PRES/ABSN URINE INCON ASSESS: CPT | Performed by: NURSE PRACTITIONER

## 2024-08-16 PROCEDURE — 99214 OFFICE O/P EST MOD 30 MIN: CPT | Performed by: NURSE PRACTITIONER

## 2024-08-16 PROCEDURE — 1123F ACP DISCUSS/DSCN MKR DOCD: CPT | Performed by: NURSE PRACTITIONER

## 2024-08-16 PROCEDURE — G8427 DOCREV CUR MEDS BY ELIG CLIN: HCPCS | Performed by: NURSE PRACTITIONER

## 2024-08-16 RX ORDER — GABAPENTIN 300 MG/1
300 CAPSULE ORAL 2 TIMES DAILY
COMMUNITY
Start: 2024-05-03

## 2024-08-16 ASSESSMENT — SLEEP AND FATIGUE QUESTIONNAIRES
HOW LIKELY ARE YOU TO NOD OFF OR FALL ASLEEP WHILE WATCHING TV: WOULD NEVER DOZE
HOW LIKELY ARE YOU TO NOD OFF OR FALL ASLEEP WHILE SITTING INACTIVE IN A PUBLIC PLACE: WOULD NEVER DOZE
ESS TOTAL SCORE: 4
HOW LIKELY ARE YOU TO NOD OFF OR FALL ASLEEP WHEN YOU ARE A PASSENGER IN A CAR FOR AN HOUR WITHOUT A BREAK: WOULD NEVER DOZE
HOW LIKELY ARE YOU TO NOD OFF OR FALL ASLEEP WHILE LYING DOWN TO REST IN THE AFTERNOON WHEN CIRCUMSTANCES PERMIT: MODERATE CHANCE OF DOZING
HOW LIKELY ARE YOU TO NOD OFF OR FALL ASLEEP WHILE SITTING AND READING: MODERATE CHANCE OF DOZING
HOW LIKELY ARE YOU TO NOD OFF OR FALL ASLEEP WHILE SITTING QUIETLY AFTER LUNCH WITHOUT ALCOHOL: WOULD NEVER DOZE
HOW LIKELY ARE YOU TO NOD OFF OR FALL ASLEEP IN A CAR, WHILE STOPPED FOR A FEW MINUTES IN TRAFFIC: WOULD NEVER DOZE
HOW LIKELY ARE YOU TO NOD OFF OR FALL ASLEEP WHILE SITTING AND TALKING TO SOMEONE: WOULD NEVER DOZE

## 2024-08-16 NOTE — PROGRESS NOTES
compliance and AHI 1.2 within this time frame.  30/30days with greater than 4 hours of machine use.  BIPAP 20/16 cm H20     Compliance download report from 7/12/24 to 8/10/24 reviewed today by me and showed patient is using machine 7:12 hrs/night with 93% compliance and AHI 2.7 within this time frame.  28/30days with greater than 4 hours of machine use.  BIPAP 20/16 cm H20         Assessment:      Severe JAYY. BIPAP 20/16 cm H2O.  Optimal compliance and efficacy on review today.     Hypersomnia- improved  Obesity  COPD-followed by Dr. Arreola  DM and HTN followed by PCP    Plan:     - Continue BiPAP 20/16 cm H2O  -Interpreted and reviewed BiPAP download data with patient  -Supply order to DME of patient's choice  - Advised to use BIPAP 7-8 hrs at night and during naps.  - Replacement of mask, tubing, head straps every 3-6 months or sooner if damaged.   - Patient instructed to contact DME company for any mask, tubing or machine trouble shooting if problems arise.  - Sleep hygiene  - Avoid sedatives, alcohol and caffeinated drinks at bed time.  - Patient counseled to never drive or operate heavy machinery while fatigue, drowsy or sleepy-patient verbalized understanding and agrees.   - Weight loss is recommended as a long-term intervention.    - Complications of JAYY if not treated were discussed with patient patient, including: systemic hypertension, pulmonary hypertension, cardiovascular morbidities, car accidents and all cause mortality.  -Patient education regarding sleep tips and PAP cleaning recommendations   -Continue blood pressure medications as ordered by treating provider- treatment of JAYY can lower blood pressure by levels that are clinically significant.      Follow up sleep: 1 year, sooner if needed.    Follow up with Dr. Arreola/Dr. Fitzgerald as recommended, sooner if needed.

## 2024-08-16 NOTE — TELEPHONE ENCOUNTER
Faxed full face mask order, CR, and ov notes to [facility] at [phone] via [employer]ax.

## 2025-08-13 ENCOUNTER — OFFICE VISIT (OUTPATIENT)
Dept: SLEEP MEDICINE | Age: 70
End: 2025-08-13
Payer: MEDICARE

## 2025-08-13 ENCOUNTER — TELEPHONE (OUTPATIENT)
Dept: SLEEP MEDICINE | Age: 70
End: 2025-08-13

## 2025-08-13 VITALS
HEART RATE: 83 BPM | RESPIRATION RATE: 16 BRPM | BODY MASS INDEX: 37.45 KG/M2 | WEIGHT: 233 LBS | OXYGEN SATURATION: 97 % | HEIGHT: 66 IN | DIASTOLIC BLOOD PRESSURE: 72 MMHG | SYSTOLIC BLOOD PRESSURE: 121 MMHG

## 2025-08-13 DIAGNOSIS — E66.9 OBESITY (BMI 30-39.9): ICD-10-CM

## 2025-08-13 DIAGNOSIS — G47.33 SEVERE OBSTRUCTIVE SLEEP APNEA: Primary | ICD-10-CM

## 2025-08-13 DIAGNOSIS — Z71.89 ENCOUNTER FOR BIPAP USE COUNSELING: ICD-10-CM

## 2025-08-13 DIAGNOSIS — R53.83 OTHER FATIGUE: ICD-10-CM

## 2025-08-13 PROCEDURE — 99213 OFFICE O/P EST LOW 20 MIN: CPT | Performed by: NURSE PRACTITIONER

## 2025-08-13 PROCEDURE — G8427 DOCREV CUR MEDS BY ELIG CLIN: HCPCS | Performed by: NURSE PRACTITIONER

## 2025-08-13 PROCEDURE — G2211 COMPLEX E/M VISIT ADD ON: HCPCS | Performed by: NURSE PRACTITIONER

## 2025-08-13 PROCEDURE — G8417 CALC BMI ABV UP PARAM F/U: HCPCS | Performed by: NURSE PRACTITIONER

## 2025-08-13 PROCEDURE — 1123F ACP DISCUSS/DSCN MKR DOCD: CPT | Performed by: NURSE PRACTITIONER

## 2025-08-13 PROCEDURE — 1159F MED LIST DOCD IN RCRD: CPT | Performed by: NURSE PRACTITIONER

## 2025-08-13 PROCEDURE — 1090F PRES/ABSN URINE INCON ASSESS: CPT | Performed by: NURSE PRACTITIONER

## 2025-08-13 PROCEDURE — 1036F TOBACCO NON-USER: CPT | Performed by: NURSE PRACTITIONER

## 2025-08-13 PROCEDURE — 3017F COLORECTAL CA SCREEN DOC REV: CPT | Performed by: NURSE PRACTITIONER

## 2025-08-13 PROCEDURE — G8400 PT W/DXA NO RESULTS DOC: HCPCS | Performed by: NURSE PRACTITIONER

## 2025-08-13 RX ORDER — SYRINGE WITH NEEDLE, 1 ML 25GX5/8"
SYRINGE, EMPTY DISPOSABLE MISCELLANEOUS
COMMUNITY
Start: 2025-08-03

## 2025-08-13 ASSESSMENT — SLEEP AND FATIGUE QUESTIONNAIRES
HOW LIKELY ARE YOU TO NOD OFF OR FALL ASLEEP WHEN YOU ARE A PASSENGER IN A CAR FOR AN HOUR WITHOUT A BREAK: WOULD NEVER DOZE
HOW LIKELY ARE YOU TO NOD OFF OR FALL ASLEEP WHILE SITTING AND READING: WOULD NEVER DOZE
HOW LIKELY ARE YOU TO NOD OFF OR FALL ASLEEP WHILE SITTING INACTIVE IN A PUBLIC PLACE: WOULD NEVER DOZE
HOW LIKELY ARE YOU TO NOD OFF OR FALL ASLEEP WHILE LYING DOWN TO REST IN THE AFTERNOON WHEN CIRCUMSTANCES PERMIT: MODERATE CHANCE OF DOZING
HOW LIKELY ARE YOU TO NOD OFF OR FALL ASLEEP IN A CAR, WHILE STOPPED FOR A FEW MINUTES IN TRAFFIC: WOULD NEVER DOZE
HOW LIKELY ARE YOU TO NOD OFF OR FALL ASLEEP WHILE SITTING AND TALKING TO SOMEONE: WOULD NEVER DOZE
HOW LIKELY ARE YOU TO NOD OFF OR FALL ASLEEP WHILE WATCHING TV: WOULD NEVER DOZE
ESS TOTAL SCORE: 2
HOW LIKELY ARE YOU TO NOD OFF OR FALL ASLEEP WHILE SITTING QUIETLY AFTER LUNCH WITHOUT ALCOHOL: WOULD NEVER DOZE